# Patient Record
Sex: MALE | Race: WHITE | NOT HISPANIC OR LATINO | URBAN - METROPOLITAN AREA
[De-identification: names, ages, dates, MRNs, and addresses within clinical notes are randomized per-mention and may not be internally consistent; named-entity substitution may affect disease eponyms.]

---

## 2017-12-27 ENCOUNTER — OFFICE VISIT - HEALTHEAST (OUTPATIENT)
Dept: ADDICTION MEDICINE | Facility: HOSPITAL | Age: 32
End: 2017-12-27

## 2017-12-27 DIAGNOSIS — F10.20 SEVERE ALCOHOL USE DISORDER (H): ICD-10-CM

## 2017-12-27 DIAGNOSIS — F14.20 MODERATE COCAINE USE DISORDER (H): ICD-10-CM

## 2017-12-28 ENCOUNTER — OFFICE VISIT - HEALTHEAST (OUTPATIENT)
Dept: ADDICTION MEDICINE | Facility: HOSPITAL | Age: 32
End: 2017-12-28

## 2017-12-28 DIAGNOSIS — F10.20 SEVERE ALCOHOL USE DISORDER (H): ICD-10-CM

## 2017-12-28 DIAGNOSIS — F14.20 MODERATE COCAINE USE DISORDER (H): ICD-10-CM

## 2018-01-03 ENCOUNTER — AMBULATORY - HEALTHEAST (OUTPATIENT)
Dept: ADDICTION MEDICINE | Facility: HOSPITAL | Age: 33
End: 2018-01-03

## 2018-01-03 ENCOUNTER — COMMUNICATION - HEALTHEAST (OUTPATIENT)
Dept: ADDICTION MEDICINE | Facility: HOSPITAL | Age: 33
End: 2018-01-03

## 2018-01-04 ENCOUNTER — OFFICE VISIT - HEALTHEAST (OUTPATIENT)
Dept: ADDICTION MEDICINE | Facility: HOSPITAL | Age: 33
End: 2018-01-04

## 2018-01-04 DIAGNOSIS — F14.20 MODERATE COCAINE USE DISORDER (H): ICD-10-CM

## 2018-01-04 DIAGNOSIS — F10.20 SEVERE ALCOHOL USE DISORDER (H): ICD-10-CM

## 2018-01-05 ENCOUNTER — AMBULATORY - HEALTHEAST (OUTPATIENT)
Dept: ADDICTION MEDICINE | Facility: HOSPITAL | Age: 33
End: 2018-01-05

## 2018-01-08 ENCOUNTER — OFFICE VISIT - HEALTHEAST (OUTPATIENT)
Dept: ADDICTION MEDICINE | Facility: HOSPITAL | Age: 33
End: 2018-01-08

## 2018-01-08 DIAGNOSIS — F14.20 MODERATE COCAINE USE DISORDER (H): ICD-10-CM

## 2018-01-08 DIAGNOSIS — F10.20 SEVERE ALCOHOL USE DISORDER (H): ICD-10-CM

## 2018-01-09 ENCOUNTER — OFFICE VISIT - HEALTHEAST (OUTPATIENT)
Dept: ADDICTION MEDICINE | Facility: HOSPITAL | Age: 33
End: 2018-01-09

## 2018-01-09 DIAGNOSIS — F14.20 MODERATE COCAINE USE DISORDER (H): ICD-10-CM

## 2018-01-09 DIAGNOSIS — F10.20 SEVERE ALCOHOL USE DISORDER (H): ICD-10-CM

## 2018-01-10 ENCOUNTER — OFFICE VISIT - HEALTHEAST (OUTPATIENT)
Dept: ADDICTION MEDICINE | Facility: HOSPITAL | Age: 33
End: 2018-01-10

## 2018-01-10 DIAGNOSIS — F14.20 MODERATE COCAINE USE DISORDER (H): ICD-10-CM

## 2018-01-10 DIAGNOSIS — F10.20 SEVERE ALCOHOL USE DISORDER (H): ICD-10-CM

## 2018-01-11 ENCOUNTER — OFFICE VISIT - HEALTHEAST (OUTPATIENT)
Dept: ADDICTION MEDICINE | Facility: HOSPITAL | Age: 33
End: 2018-01-11

## 2018-01-11 ENCOUNTER — AMBULATORY - HEALTHEAST (OUTPATIENT)
Dept: ADDICTION MEDICINE | Facility: HOSPITAL | Age: 33
End: 2018-01-11

## 2018-01-11 DIAGNOSIS — F10.20 SEVERE ALCOHOL USE DISORDER (H): ICD-10-CM

## 2018-01-11 DIAGNOSIS — F14.20 MODERATE COCAINE USE DISORDER (H): ICD-10-CM

## 2018-01-12 ENCOUNTER — AMBULATORY - HEALTHEAST (OUTPATIENT)
Dept: ADDICTION MEDICINE | Facility: HOSPITAL | Age: 33
End: 2018-01-12

## 2018-01-18 ENCOUNTER — OFFICE VISIT - HEALTHEAST (OUTPATIENT)
Dept: ADDICTION MEDICINE | Facility: HOSPITAL | Age: 33
End: 2018-01-18

## 2018-01-18 DIAGNOSIS — F10.20 SEVERE ALCOHOL USE DISORDER (H): ICD-10-CM

## 2018-01-18 DIAGNOSIS — F14.20 MODERATE COCAINE USE DISORDER (H): ICD-10-CM

## 2018-01-19 ENCOUNTER — AMBULATORY - HEALTHEAST (OUTPATIENT)
Dept: LAB | Facility: HOSPITAL | Age: 33
End: 2018-01-19

## 2018-01-19 ENCOUNTER — AMBULATORY - HEALTHEAST (OUTPATIENT)
Dept: ADDICTION MEDICINE | Facility: HOSPITAL | Age: 33
End: 2018-01-19

## 2018-01-19 DIAGNOSIS — F10.20 SEVERE ALCOHOL USE DISORDER (H): ICD-10-CM

## 2018-01-19 LAB
AMPHETAMINES UR QL SCN: NORMAL
BARBITURATES UR QL: NORMAL
BENZODIAZ UR QL: NORMAL
CANNABINOIDS UR QL SCN: NORMAL
COCAINE UR QL: NORMAL
CREAT UR-MCNC: 121.1 MG/DL
ETHANOL UR CFM-MCNC: <10 MG/DL
METHADONE UR QL SCN: NORMAL
OPIATES UR QL SCN: NORMAL
OXYCODONE UR QL: NORMAL
PCP UR QL SCN: NORMAL

## 2018-01-22 ENCOUNTER — COMMUNICATION - HEALTHEAST (OUTPATIENT)
Dept: ADDICTION MEDICINE | Facility: HOSPITAL | Age: 33
End: 2018-01-22

## 2018-01-25 ENCOUNTER — COMMUNICATION - HEALTHEAST (OUTPATIENT)
Dept: ADDICTION MEDICINE | Facility: HOSPITAL | Age: 33
End: 2018-01-25

## 2018-01-29 ENCOUNTER — OFFICE VISIT - HEALTHEAST (OUTPATIENT)
Dept: ADDICTION MEDICINE | Facility: HOSPITAL | Age: 33
End: 2018-01-29

## 2018-01-29 DIAGNOSIS — F10.20 SEVERE ALCOHOL USE DISORDER (H): ICD-10-CM

## 2018-01-29 DIAGNOSIS — F14.20 MODERATE COCAINE USE DISORDER (H): ICD-10-CM

## 2018-01-30 ENCOUNTER — OFFICE VISIT - HEALTHEAST (OUTPATIENT)
Dept: ADDICTION MEDICINE | Facility: HOSPITAL | Age: 33
End: 2018-01-30

## 2018-01-30 DIAGNOSIS — F14.20 MODERATE COCAINE USE DISORDER (H): ICD-10-CM

## 2018-01-30 DIAGNOSIS — F10.20 SEVERE ALCOHOL USE DISORDER (H): ICD-10-CM

## 2018-01-31 ENCOUNTER — OFFICE VISIT - HEALTHEAST (OUTPATIENT)
Dept: ADDICTION MEDICINE | Facility: HOSPITAL | Age: 33
End: 2018-01-31

## 2018-01-31 ENCOUNTER — AMBULATORY - HEALTHEAST (OUTPATIENT)
Dept: ADDICTION MEDICINE | Facility: HOSPITAL | Age: 33
End: 2018-01-31

## 2018-01-31 DIAGNOSIS — F10.20 SEVERE ALCOHOL USE DISORDER (H): ICD-10-CM

## 2018-01-31 DIAGNOSIS — F14.20 MODERATE COCAINE USE DISORDER (H): ICD-10-CM

## 2018-02-01 ENCOUNTER — OFFICE VISIT - HEALTHEAST (OUTPATIENT)
Dept: ADDICTION MEDICINE | Facility: HOSPITAL | Age: 33
End: 2018-02-01

## 2018-02-01 DIAGNOSIS — F10.20 SEVERE ALCOHOL USE DISORDER (H): ICD-10-CM

## 2018-02-01 DIAGNOSIS — F14.20 MODERATE COCAINE USE DISORDER (H): ICD-10-CM

## 2018-02-06 ENCOUNTER — COMMUNICATION - HEALTHEAST (OUTPATIENT)
Dept: ADDICTION MEDICINE | Facility: HOSPITAL | Age: 33
End: 2018-02-06

## 2018-02-07 ENCOUNTER — OFFICE VISIT - HEALTHEAST (OUTPATIENT)
Dept: ADDICTION MEDICINE | Facility: HOSPITAL | Age: 33
End: 2018-02-07

## 2018-02-07 DIAGNOSIS — F14.20 MODERATE COCAINE USE DISORDER (H): ICD-10-CM

## 2018-02-07 DIAGNOSIS — F10.20 SEVERE ALCOHOL USE DISORDER (H): ICD-10-CM

## 2018-02-08 ENCOUNTER — AMBULATORY - HEALTHEAST (OUTPATIENT)
Dept: ADDICTION MEDICINE | Facility: HOSPITAL | Age: 33
End: 2018-02-08

## 2018-02-08 ENCOUNTER — OFFICE VISIT - HEALTHEAST (OUTPATIENT)
Dept: ADDICTION MEDICINE | Facility: HOSPITAL | Age: 33
End: 2018-02-08

## 2018-02-08 DIAGNOSIS — F10.20 SEVERE ALCOHOL USE DISORDER (H): ICD-10-CM

## 2018-02-08 DIAGNOSIS — F14.20 MODERATE COCAINE USE DISORDER (H): ICD-10-CM

## 2018-02-12 ENCOUNTER — OFFICE VISIT - HEALTHEAST (OUTPATIENT)
Dept: ADDICTION MEDICINE | Facility: HOSPITAL | Age: 33
End: 2018-02-12

## 2018-02-12 DIAGNOSIS — F10.20 SEVERE ALCOHOL USE DISORDER (H): ICD-10-CM

## 2018-02-12 DIAGNOSIS — F14.20 MODERATE COCAINE USE DISORDER (H): ICD-10-CM

## 2018-02-13 ENCOUNTER — AMBULATORY - HEALTHEAST (OUTPATIENT)
Dept: LAB | Facility: HOSPITAL | Age: 33
End: 2018-02-13

## 2018-02-13 ENCOUNTER — OFFICE VISIT - HEALTHEAST (OUTPATIENT)
Dept: ADDICTION MEDICINE | Facility: HOSPITAL | Age: 33
End: 2018-02-13

## 2018-02-13 DIAGNOSIS — F10.20 SEVERE ALCOHOL USE DISORDER (H): ICD-10-CM

## 2018-02-13 DIAGNOSIS — F14.20 MODERATE COCAINE USE DISORDER (H): ICD-10-CM

## 2018-02-13 LAB
AMPHETAMINES UR QL SCN: NORMAL
BARBITURATES UR QL: NORMAL
BENZODIAZ UR QL: NORMAL
CANNABINOIDS UR QL SCN: NORMAL
COCAINE UR QL: NORMAL
CREAT UR-MCNC: 20.8 MG/DL
ETHANOL UR CFM-MCNC: <10 MG/DL
METHADONE UR QL SCN: NORMAL
OPIATES UR QL SCN: NORMAL
OXYCODONE UR QL: NORMAL
PCP UR QL SCN: NORMAL

## 2018-02-15 ENCOUNTER — AMBULATORY - HEALTHEAST (OUTPATIENT)
Dept: ADDICTION MEDICINE | Facility: HOSPITAL | Age: 33
End: 2018-02-15

## 2018-02-19 ENCOUNTER — OFFICE VISIT - HEALTHEAST (OUTPATIENT)
Dept: ADDICTION MEDICINE | Facility: HOSPITAL | Age: 33
End: 2018-02-19

## 2018-02-19 DIAGNOSIS — F10.20 SEVERE ALCOHOL USE DISORDER (H): ICD-10-CM

## 2018-02-19 DIAGNOSIS — F14.20 MODERATE COCAINE USE DISORDER (H): ICD-10-CM

## 2018-02-20 ENCOUNTER — OFFICE VISIT - HEALTHEAST (OUTPATIENT)
Dept: ADDICTION MEDICINE | Facility: HOSPITAL | Age: 33
End: 2018-02-20

## 2018-02-20 DIAGNOSIS — F10.20 SEVERE ALCOHOL USE DISORDER (H): ICD-10-CM

## 2018-02-20 DIAGNOSIS — F14.20 MODERATE COCAINE USE DISORDER (H): ICD-10-CM

## 2018-02-22 ENCOUNTER — AMBULATORY - HEALTHEAST (OUTPATIENT)
Dept: ADDICTION MEDICINE | Facility: HOSPITAL | Age: 33
End: 2018-02-22

## 2018-02-22 ENCOUNTER — COMMUNICATION - HEALTHEAST (OUTPATIENT)
Dept: ADDICTION MEDICINE | Facility: HOSPITAL | Age: 33
End: 2018-02-22

## 2018-02-26 ENCOUNTER — OFFICE VISIT - HEALTHEAST (OUTPATIENT)
Dept: ADDICTION MEDICINE | Facility: HOSPITAL | Age: 33
End: 2018-02-26

## 2018-02-26 DIAGNOSIS — F10.20 SEVERE ALCOHOL USE DISORDER (H): ICD-10-CM

## 2018-02-26 DIAGNOSIS — F14.20 MODERATE COCAINE USE DISORDER (H): ICD-10-CM

## 2018-02-27 ENCOUNTER — AMBULATORY - HEALTHEAST (OUTPATIENT)
Dept: ADDICTION MEDICINE | Facility: HOSPITAL | Age: 33
End: 2018-02-27

## 2018-02-27 ENCOUNTER — OFFICE VISIT - HEALTHEAST (OUTPATIENT)
Dept: ADDICTION MEDICINE | Facility: HOSPITAL | Age: 33
End: 2018-02-27

## 2018-02-27 DIAGNOSIS — F14.20 MODERATE COCAINE USE DISORDER (H): ICD-10-CM

## 2018-02-27 DIAGNOSIS — F10.20 SEVERE ALCOHOL USE DISORDER (H): ICD-10-CM

## 2018-03-08 ENCOUNTER — OFFICE VISIT - HEALTHEAST (OUTPATIENT)
Dept: ADDICTION MEDICINE | Facility: HOSPITAL | Age: 33
End: 2018-03-08

## 2018-03-08 DIAGNOSIS — F14.20 MODERATE COCAINE USE DISORDER (H): ICD-10-CM

## 2018-03-08 DIAGNOSIS — F10.20 SEVERE ALCOHOL USE DISORDER (H): ICD-10-CM

## 2018-03-09 ENCOUNTER — AMBULATORY - HEALTHEAST (OUTPATIENT)
Dept: ADDICTION MEDICINE | Facility: HOSPITAL | Age: 33
End: 2018-03-09

## 2018-03-14 ENCOUNTER — OFFICE VISIT - HEALTHEAST (OUTPATIENT)
Dept: ADDICTION MEDICINE | Facility: HOSPITAL | Age: 33
End: 2018-03-14

## 2018-03-14 DIAGNOSIS — F14.20 MODERATE COCAINE USE DISORDER (H): ICD-10-CM

## 2018-03-14 DIAGNOSIS — F10.20 SEVERE ALCOHOL USE DISORDER (H): ICD-10-CM

## 2018-03-15 ENCOUNTER — OFFICE VISIT - HEALTHEAST (OUTPATIENT)
Dept: ADDICTION MEDICINE | Facility: HOSPITAL | Age: 33
End: 2018-03-15

## 2018-03-15 ENCOUNTER — AMBULATORY - HEALTHEAST (OUTPATIENT)
Dept: ADDICTION MEDICINE | Facility: HOSPITAL | Age: 33
End: 2018-03-15

## 2018-03-15 DIAGNOSIS — F14.20 MODERATE COCAINE USE DISORDER (H): ICD-10-CM

## 2018-03-15 DIAGNOSIS — F10.20 SEVERE ALCOHOL USE DISORDER (H): ICD-10-CM

## 2018-03-21 ENCOUNTER — COMMUNICATION - HEALTHEAST (OUTPATIENT)
Dept: ADDICTION MEDICINE | Facility: HOSPITAL | Age: 33
End: 2018-03-21

## 2018-03-22 ENCOUNTER — OFFICE VISIT - HEALTHEAST (OUTPATIENT)
Dept: ADDICTION MEDICINE | Facility: HOSPITAL | Age: 33
End: 2018-03-22

## 2018-03-22 DIAGNOSIS — F14.20 MODERATE COCAINE USE DISORDER (H): ICD-10-CM

## 2018-03-22 DIAGNOSIS — F10.20 SEVERE ALCOHOL USE DISORDER (H): ICD-10-CM

## 2018-03-23 ENCOUNTER — AMBULATORY - HEALTHEAST (OUTPATIENT)
Dept: LAB | Facility: HOSPITAL | Age: 33
End: 2018-03-23

## 2018-03-23 ENCOUNTER — AMBULATORY - HEALTHEAST (OUTPATIENT)
Dept: ADDICTION MEDICINE | Facility: HOSPITAL | Age: 33
End: 2018-03-23

## 2018-03-23 DIAGNOSIS — F10.20 SEVERE ALCOHOL USE DISORDER (H): ICD-10-CM

## 2018-03-23 LAB
AMPHETAMINES UR QL SCN: ABNORMAL
BARBITURATES UR QL: ABNORMAL
BENZODIAZ UR QL: ABNORMAL
CANNABINOIDS UR QL SCN: ABNORMAL
COCAINE UR QL: ABNORMAL
CREAT UR-MCNC: 482 MG/DL
ETHANOL UR CFM-MCNC: <10 MG/DL
METHADONE UR QL SCN: ABNORMAL
OPIATES UR QL SCN: ABNORMAL
OXYCODONE UR QL: ABNORMAL
PCP UR QL SCN: ABNORMAL

## 2018-03-26 LAB
BENZOYLECGONINE-BY GC/MS: 1375 NG/ML
COCAINE-BY GC/MS: NEGATIVE NG/ML
TOXICOLOGIST REVIEW: NORMAL

## 2018-03-28 ENCOUNTER — OFFICE VISIT - HEALTHEAST (OUTPATIENT)
Dept: ADDICTION MEDICINE | Facility: HOSPITAL | Age: 33
End: 2018-03-28

## 2018-03-28 DIAGNOSIS — F10.20 SEVERE ALCOHOL USE DISORDER (H): ICD-10-CM

## 2018-03-28 DIAGNOSIS — F14.20 MODERATE COCAINE USE DISORDER (H): ICD-10-CM

## 2018-03-29 ENCOUNTER — OFFICE VISIT - HEALTHEAST (OUTPATIENT)
Dept: ADDICTION MEDICINE | Facility: HOSPITAL | Age: 33
End: 2018-03-29

## 2018-03-29 DIAGNOSIS — F10.20 SEVERE ALCOHOL USE DISORDER (H): ICD-10-CM

## 2018-03-29 DIAGNOSIS — F14.20 MODERATE COCAINE USE DISORDER (H): ICD-10-CM

## 2018-03-30 ENCOUNTER — AMBULATORY - HEALTHEAST (OUTPATIENT)
Dept: ADDICTION MEDICINE | Facility: HOSPITAL | Age: 33
End: 2018-03-30

## 2018-04-02 ENCOUNTER — OFFICE VISIT - HEALTHEAST (OUTPATIENT)
Dept: ADDICTION MEDICINE | Facility: HOSPITAL | Age: 33
End: 2018-04-02

## 2018-04-02 DIAGNOSIS — F14.20 MODERATE COCAINE USE DISORDER (H): ICD-10-CM

## 2018-04-02 DIAGNOSIS — F10.20 SEVERE ALCOHOL USE DISORDER (H): ICD-10-CM

## 2018-04-05 ENCOUNTER — AMBULATORY - HEALTHEAST (OUTPATIENT)
Dept: ADDICTION MEDICINE | Facility: HOSPITAL | Age: 33
End: 2018-04-05

## 2018-04-10 ENCOUNTER — COMMUNICATION - HEALTHEAST (OUTPATIENT)
Dept: ADDICTION MEDICINE | Facility: HOSPITAL | Age: 33
End: 2018-04-10

## 2018-04-11 ENCOUNTER — OFFICE VISIT - HEALTHEAST (OUTPATIENT)
Dept: ADDICTION MEDICINE | Facility: HOSPITAL | Age: 33
End: 2018-04-11

## 2018-04-11 DIAGNOSIS — F10.20 SEVERE ALCOHOL USE DISORDER (H): ICD-10-CM

## 2018-04-11 DIAGNOSIS — F14.20 MODERATE COCAINE USE DISORDER (H): ICD-10-CM

## 2018-04-12 ENCOUNTER — AMBULATORY - HEALTHEAST (OUTPATIENT)
Dept: ADDICTION MEDICINE | Facility: HOSPITAL | Age: 33
End: 2018-04-12

## 2018-04-18 ENCOUNTER — COMMUNICATION - HEALTHEAST (OUTPATIENT)
Dept: ADDICTION MEDICINE | Facility: HOSPITAL | Age: 33
End: 2018-04-18

## 2018-04-23 ENCOUNTER — COMMUNICATION - HEALTHEAST (OUTPATIENT)
Dept: ADDICTION MEDICINE | Facility: HOSPITAL | Age: 33
End: 2018-04-23

## 2018-04-25 ENCOUNTER — OFFICE VISIT - HEALTHEAST (OUTPATIENT)
Dept: ADDICTION MEDICINE | Facility: HOSPITAL | Age: 33
End: 2018-04-25

## 2018-04-25 DIAGNOSIS — F14.20 MODERATE COCAINE USE DISORDER (H): ICD-10-CM

## 2018-04-25 DIAGNOSIS — F10.20 SEVERE ALCOHOL USE DISORDER (H): ICD-10-CM

## 2018-04-26 ENCOUNTER — AMBULATORY - HEALTHEAST (OUTPATIENT)
Dept: ADDICTION MEDICINE | Facility: HOSPITAL | Age: 33
End: 2018-04-26

## 2018-05-02 ENCOUNTER — COMMUNICATION - HEALTHEAST (OUTPATIENT)
Dept: ADDICTION MEDICINE | Facility: HOSPITAL | Age: 33
End: 2018-05-02

## 2018-05-09 ENCOUNTER — OFFICE VISIT - HEALTHEAST (OUTPATIENT)
Dept: ADDICTION MEDICINE | Facility: HOSPITAL | Age: 33
End: 2018-05-09

## 2018-05-09 DIAGNOSIS — F14.20 MODERATE COCAINE USE DISORDER (H): ICD-10-CM

## 2018-05-09 DIAGNOSIS — F10.20 SEVERE ALCOHOL USE DISORDER (H): ICD-10-CM

## 2018-05-10 ENCOUNTER — AMBULATORY - HEALTHEAST (OUTPATIENT)
Dept: ADDICTION MEDICINE | Facility: HOSPITAL | Age: 33
End: 2018-05-10

## 2018-05-17 ENCOUNTER — OFFICE VISIT - HEALTHEAST (OUTPATIENT)
Dept: ADDICTION MEDICINE | Facility: HOSPITAL | Age: 33
End: 2018-05-17

## 2018-05-17 DIAGNOSIS — F14.20 MODERATE COCAINE USE DISORDER (H): ICD-10-CM

## 2018-05-17 DIAGNOSIS — F10.20 SEVERE ALCOHOL USE DISORDER (H): ICD-10-CM

## 2018-05-18 ENCOUNTER — AMBULATORY - HEALTHEAST (OUTPATIENT)
Dept: ADDICTION MEDICINE | Facility: HOSPITAL | Age: 33
End: 2018-05-18

## 2020-07-16 ENCOUNTER — COMMUNICATION - HEALTHEAST (OUTPATIENT)
Dept: SCHEDULING | Facility: CLINIC | Age: 35
End: 2020-07-16

## 2021-01-21 ENCOUNTER — SURGERY - HEALTHEAST (OUTPATIENT)
Dept: SURGERY | Facility: HOSPITAL | Age: 36
End: 2021-01-21

## 2021-01-21 ENCOUNTER — ANESTHESIA - HEALTHEAST (OUTPATIENT)
Dept: SURGERY | Facility: HOSPITAL | Age: 36
End: 2021-01-21

## 2021-01-21 ASSESSMENT — MIFFLIN-ST. JEOR
SCORE: 2281.17
SCORE: 2281.17

## 2021-01-22 ENCOUNTER — COMMUNICATION - HEALTHEAST (OUTPATIENT)
Dept: SCHEDULING | Facility: CLINIC | Age: 36
End: 2021-01-22

## 2021-01-22 ASSESSMENT — MIFFLIN-ST. JEOR
SCORE: 2319.73
SCORE: 2319.73

## 2021-01-24 ENCOUNTER — SURGERY - HEALTHEAST (OUTPATIENT)
Dept: SURGERY | Facility: HOSPITAL | Age: 36
End: 2021-01-24

## 2021-01-24 ENCOUNTER — ANESTHESIA - HEALTHEAST (OUTPATIENT)
Dept: SURGERY | Facility: HOSPITAL | Age: 36
End: 2021-01-24

## 2021-01-25 ENCOUNTER — COMMUNICATION - HEALTHEAST (OUTPATIENT)
Dept: SURGERY | Facility: CLINIC | Age: 36
End: 2021-01-25

## 2021-01-25 ASSESSMENT — MIFFLIN-ST. JEOR
SCORE: 2330.61
SCORE: 2330.61

## 2021-02-02 ENCOUNTER — OFFICE VISIT - HEALTHEAST (OUTPATIENT)
Dept: SURGERY | Facility: CLINIC | Age: 36
End: 2021-02-02

## 2021-02-02 DIAGNOSIS — M72.6: ICD-10-CM

## 2021-02-02 DIAGNOSIS — Z48.89 POSTOPERATIVE VISIT: ICD-10-CM

## 2021-02-04 ENCOUNTER — OFFICE VISIT - HEALTHEAST (OUTPATIENT)
Dept: SURGERY | Facility: CLINIC | Age: 36
End: 2021-02-04

## 2021-02-04 DIAGNOSIS — Z48.89 POSTOPERATIVE VISIT: ICD-10-CM

## 2021-02-15 ENCOUNTER — OFFICE VISIT - HEALTHEAST (OUTPATIENT)
Dept: SURGERY | Facility: CLINIC | Age: 36
End: 2021-02-15

## 2021-02-15 ENCOUNTER — COMMUNICATION - HEALTHEAST (OUTPATIENT)
Dept: SURGERY | Facility: CLINIC | Age: 36
End: 2021-02-15

## 2021-02-15 DIAGNOSIS — Z48.89 POSTOPERATIVE VISIT: ICD-10-CM

## 2021-02-15 DIAGNOSIS — M72.6: ICD-10-CM

## 2021-02-15 DIAGNOSIS — R21 RASH: ICD-10-CM

## 2021-02-15 RX ORDER — CETIRIZINE HYDROCHLORIDE 10 MG/1
10 TABLET ORAL DAILY
Refills: 0 | Status: SHIPPED | COMMUNITY
Start: 2021-02-15

## 2021-02-23 ENCOUNTER — OFFICE VISIT - HEALTHEAST (OUTPATIENT)
Dept: SURGERY | Facility: CLINIC | Age: 36
End: 2021-02-23

## 2021-02-23 DIAGNOSIS — Z48.89 POSTOPERATIVE VISIT: ICD-10-CM

## 2021-05-30 ENCOUNTER — RECORDS - HEALTHEAST (OUTPATIENT)
Dept: ADMINISTRATIVE | Facility: CLINIC | Age: 36
End: 2021-05-30

## 2021-06-05 VITALS
HEIGHT: 69 IN | BODY MASS INDEX: 46.05 KG/M2 | WEIGHT: 310.9 LBS | HEIGHT: 69 IN | WEIGHT: 310.9 LBS | BODY MASS INDEX: 46.05 KG/M2

## 2021-06-09 NOTE — TELEPHONE ENCOUNTER
"Pt calling    Coworker has tested + for covid 2 weeks ago  Pt was in close contact with this person    Pt to be tested sat for covid but needs letter that he is under care of a medical professional     Pt symptoms started 2 day ago  Sweating   Chills  Fever not known,pt has no thermometer fatigue   Loss of sense of taste  Body aches  shortness of breath only with exertion  diarrhea  +HA  Per pt 'slight cough\"    Referred pt to [facility]  Reviewed care advice & when to call back  Pt agrees with plan  Tia Ogden RN  [facility] Nurse Advisor    Reason for Disposition    [1] COVID-19 infection suspected by caller or triager AND [2] mild symptoms (cough, fever, or others) AND [3] no complications or SOB     Pt contacting [email or web address] & has appt for covid test 7/18/2020    Additional Information    Negative: SEVERE difficulty breathing (e.g., struggling for each breath, speaks in single words)    Negative: Difficult to awaken or acting confused (e.g., disoriented, slurred speech)    Negative: Bluish (or gray) lips or face now    Negative: Shock suspected (e.g., cold/pale/clammy skin, too weak to stand, low BP, rapid pulse)    Negative: Sounds like a life-threatening emergency to the triager    Negative: [1] COVID-19 exposure AND [2] no symptoms    Negative: COVID-19 and Breastfeeding, questions about    Negative: [1] Adult with possible COVID-19 symptoms AND [2] triager concerned about severity of symptoms or other causes    Negative: SEVERE or constant chest pain or pressure (Exception: mild central chest pain, present only when coughing)    Negative: MODERATE difficulty breathing (e.g., speaks in phrases, SOB even at rest, pulse 100-120)    Negative: Patient sounds very sick or weak to the triager    Negative: MILD difficulty breathing (e.g., minimal/no SOB at rest, SOB with walking, pulse <100)    Negative: Chest pain or pressure    Negative: Fever > 103 F (39.4 C)    Negative: [1] Fever > 101 F (38.3 C) AND [2] age > 60    " Negative: [1] Fever > 100.0 F (37.8 C) AND [2] bedridden (e.g., nursing home patient, CVA, chronic illness, recovering from surgery)    Negative: HIGH RISK patient (e.g., age > 64 years, diabetes, heart or lung disease, weak immune system)    Negative: Fever present > 3 days (72 hours)    Negative: [1] Fever returns after gone for over 24 hours AND [2] symptoms worse or not improved    Negative: [1] Continuous (nonstop) coughing interferes with work or school AND [2] no improvement using cough treatment per protocol    [facility] Specific Disposition  - [facility] Specific Patient Instructions  COVID 19 Nurse Triage Plan/Patient Instructions    Please be aware that novel coronavirus (COVID-19) may be circulating in the community. If you develop symptoms such as fever, cough, or SOB or if you have concerns about the presence of another infection including coronavirus (COVID-19), please contact your health care provider or visit [email or web address].       Virtual Visit with provider recommended. Reference Visit Selection Guide.    Thank you for taking steps to prevent the spread of this virus.  Limit your contact with others.  Wear a simple mask to cover your cough.  Wash your hands well and often.    Resources  [facility]: About COVID-19: [email or web address]  CDC: What to Do If You're Sick: www.cdc.gov/coronavirus/2019-ncov/about/steps-when-sick.html  CDC: Ending Home Isolation: www.cdc.gov/coronavirus/2019-ncov/hcp/disposition-in-home-patients.html   CDC: Caring for Someone: www.cdc.gov/coronavirus/2019-ncov/if-you-are-sick/care-for-someone.html   [facility]: Interim Guidance for Hospital Discharge to Home: www.health.[facility].[place]/diseases/coronavirus/hcp/hospdischarge.pdf  [facility] clinical trials (COVID-19 research studies): [email or web address]/[email or web address]-trials   Below are the COVID-19 hotlines at the [place] Department of [facility] ([facility]). Interpreters are available.   For  health questions: Call 858-816-1459 or 1-157.222.4101 (7 a.m. to 7 p.m.)  For questions about schools and childcare: Call 069-044-3277 or 1-243.707.1597 (7 a.m. to 7 p.m.)    Protocols used: CORONAVIRUS (COVID-19) DIAGNOSED OR [id number]-Q-DG 5.16.20

## 2021-06-14 NOTE — ANESTHESIA PREPROCEDURE EVALUATION
Anesthesia Evaluation      Patient summary reviewed   No history of anesthetic complications     Airway   Mallampati: III  Neck ROM: full   Pulmonary     breath sounds clear to auscultation  (+) a smoker  (-) pneumonia, asthma, shortness of breath, recent URI, sleep apnea                         Cardiovascular   Exercise tolerance: > or = 4 METS  (-) angina  Rhythm: regular  Rate: normal,         Neuro/Psych    (-) no CVA    Endo/Other    (+) obesity,   (-) no diabetes     GI/Hepatic/Renal            Dental    (+) caps                       Anesthesia Plan  Planned anesthetic: general endotracheal  Decadron 10mg, zofran   Ketamine 50mg  glidescope intubation   ASA 3   Induction: intravenous   Anesthetic plan and risks discussed with: patient  Anesthesia plan special considerations: video-assisted, increased risk of difficult airway, antiemetics,   Post-op plan: routine recovery

## 2021-06-14 NOTE — ANESTHESIA POSTPROCEDURE EVALUATION
Patient: Glenn Rene  Procedure(s):  INCISION AND DRAINAGE, LOWER EXTREMITY (Right)  Anesthesia type: general    Patient location: PACU  Last vitals:   Vitals Value Taken Time   /63 01/24/21 1650   Temp  01/24/21 1701   Pulse 80 01/24/21 1657   Resp 22 01/24/21 1657   SpO2 98 % 01/24/21 1657   Vitals shown include unvalidated device data.  Post vital signs: stable  Level of consciousness: awake and responds to simple questions  Post-anesthesia pain: pain controlled  Post-anesthesia nausea and vomiting: no  Pulmonary: unassisted, return to baseline  Cardiovascular: stable and blood pressure at baseline  Hydration: adequate  Anesthetic events: no    QCDR Measures:  ASA# 11 - Ursula-op Cardiac Arrest: ASA11B - Patient did NOT experience unanticipated cardiac arrest  ASA# 12 - Ursula-op Mortality Rate: ASA12B - Patient did NOT die  ASA# 13 - PACU Re-Intubation Rate: ASA13B - Patient did NOT require a new airway mgmt  ASA# 10 - Composite Anes Safety: ASA10A - No serious adverse event    Additional Notes:

## 2021-06-14 NOTE — ANESTHESIA PREPROCEDURE EVALUATION
Anesthesia Evaluation      Patient summary reviewed   No history of anesthetic complications     Airway   Mallampati: II  Neck ROM: full   Pulmonary - normal exam   (+) a smoker                         Cardiovascular - negative ROS and normal exam  Exercise tolerance: > or = 4 METS   Neuro/Psych - negative ROS     Endo/Other    (+) obesity (BMI 44),      Comments: Necrotizing faciitis, right groin, upper thigh    GI/Hepatic/Renal - negative ROS   (-) GERD          Dental - normal exam                        Anesthesia Plan  Planned anesthetic: general endotracheal and total IV anesthesia  Ketamine  Decadron  Zofran    Reverse with Sugammadex  ASA 3 - emergent   Induction: intravenous   Anesthetic plan and risks discussed with: patient  Anesthesia plan special considerations: antiemetics,   Post-op plan: routine recovery

## 2021-06-14 NOTE — PROGRESS NOTES
HPI: Pt is here for follow up of a Necrotizing fasciitis right thigh status post I&D 1/21/21 with Dr. Michaud and  re-excision 1/24/21 with Dr. Silverman.  Patient hospitalized between 1/21/2021 and 1/26/2021.  Discharged with augmentin 875mg PO two times a day and clinda 450mg three times a day each for ten daysh he is doing well.  Pain is well controlled.  No difficulties with the surgical wound/wounds.  he is eating well and denies fever and chills.     Is in a lot of pain still and ran out of his pain medications.  He does not have a fever or chills.  He is packing his own wounds.    There were no vitals taken for this visit.    EXAM:  GENERAL:Appears well  Right thigh wounds are granulating in and closing down.  He has a lot of induration surrounding.  His erythema has improved.  He has a little bit of a light lacy erythema down his leg but it is not consistent with cellulitis.  Probed wound and did not notice any fluid collection.  Repacked the wounds.    Assessment/Plan: .  Reviewed how to pack his wound again.  But I am concerned about the amount of induration.  Since he does not have a fever and this definitely looks much better than when I last saw it we will does have him follow-up with Dr. Michaud this week.  If any increase in pain or fevers he should be going to the emergency room for imaging.  Kristyn Tolbert PA-C  [facility] Department of Surgery

## 2021-06-14 NOTE — ANESTHESIA CARE TRANSFER NOTE
Last vitals:   Vitals:    01/21/21 2220   BP: 133/84   Pulse: (!) 104   Resp: (!) 32   Temp: 37  C (98.6  F)   SpO2: 98%     Patient's level of consciousness is drowsy  Spontaneous respirations: yes  Maintains airway independently: yes  Dentition unchanged: yes  Oropharynx: oropharynx clear of all foreign objects    QCDR Measures:  ASA# 20 - Surgical Safety Checklist: WHO surgical safety checklist completed prior to induction    PQRS# 430 - Adult PONV Prevention: 4558F - Pt received => 2 anti-emetic agents (different classes) preop & intraop  ASA# 8 - Peds PONV Prevention: NA - Not pediatric patient, not GA or 2 or more risk factors NOT present  PQRS# 424 - Ursula-op Temp Management: 4559F - At least one body temp DOCUMENTED => 35.5C or 95.9F within required timeframe  PQRS# 426 - PACU Transfer Protocol: - Transfer of care checklist used  ASA# 14 - Acute Post-op Pain: ASA14B - Patient did NOT experience pain >= 7 out of 10

## 2021-06-14 NOTE — ANESTHESIA CARE TRANSFER NOTE
Last vitals:   Vitals:    01/24/21 1530   BP: 166/88   Pulse: 86   Resp: 16   Temp: 37  C (98.6  F)   SpO2: 96%     Patient's level of consciousness is drowsy  Spontaneous respirations: yes  Maintains airway independently: yes  Dentition unchanged: yes  Oropharynx: oropharynx clear of all foreign objects    QCDR Measures:  ASA# 20 - Surgical Safety Checklist: WHO surgical safety checklist completed prior to induction    PQRS# 430 - Adult PONV Prevention: 4558F - Pt received => 2 anti-emetic agents (different classes) preop & intraop  ASA# 8 - Peds PONV Prevention: NA - Not pediatric patient, not GA or 2 or more risk factors NOT present  PQRS# 424 - Ursula-op Temp Management: 4559F - At least one body temp DOCUMENTED => 35.5C or 95.9F within required timeframe  PQRS# 426 - PACU Transfer Protocol: - Transfer of care checklist used  ASA# 14 - Acute Post-op Pain: ASA14B - Patient did NOT experience pain >= 7 out of 10

## 2021-06-14 NOTE — ANESTHESIA POSTPROCEDURE EVALUATION
Patient: Glenn Rene  Procedure(s):  INCISION AND DRAINAGE RIGHT LEG/UPPER THIGH (Right)  Anesthesia type: general    Patient location: PACU  Last vitals:   Vitals Value Taken Time   /82 01/21/21 2250   Temp 37  C (98.6  F) 01/21/21 2220   Pulse 96 01/21/21 2252   Resp 27 01/21/21 2252   SpO2 100 % 01/21/21 2252   Vitals shown include unvalidated device data.  Post vital signs: stable  Level of consciousness: awake, alert and responds to simple questions  Post-anesthesia pain: pain controlled  Post-anesthesia nausea and vomiting: no  Pulmonary: unassisted, face mask  Cardiovascular: stable and blood pressure at baseline  Hydration: adequate  Anesthetic events: no    QCDR Measures:  ASA# 11 - Ursula-op Cardiac Arrest: ASA11B - Patient did NOT experience unanticipated cardiac arrest  ASA# 12 - Ursula-op Mortality Rate: ASA12B - Patient did NOT die  ASA# 13 - PACU Re-Intubation Rate: ASA13B - Patient did NOT require a new airway mgmt  ASA# 10 - Composite Anes Safety: ASA10A - No serious adverse event    Additional Notes:

## 2021-06-15 NOTE — PROGRESS NOTES
Daily Group Note:    Glenn Rene attended 2 hours of group therapy today. T) Co-Occurring Disorders.      Staff Name and Title: RANDOLPH Jaime, [facility]    Date:  1/10/2018  Time:  6:57 PM

## 2021-06-15 NOTE — PROGRESS NOTES
Daily Group Note:    Glenn Rene attended 3 hours of group therapy today. T) Unmanageable Feelings.      Staff Name and Title: RANDOLPH Jaime, [facility]    Date:  2/7/2018  Time:  8:36 PM

## 2021-06-15 NOTE — PROGRESS NOTES
Weekly Progress Note  Glenn Rene  1985  [id number]      D) Pt. attended 4 groups this week with 0 absences. Pt. attended 0 individual sessions this week. A) Staff facilitated groups and reviewed tx progress. Assessed for [facility]. R) No VAP needed at this time.    Any significant events, defines as events that impact patients relationship with others inside and outside of treatment: No.  Indicate any changes or monitoring of physical or mental health problems: No.    Indicate involvement by any outside supports: No  IAPP reviewed and modified as needed: N/A       Pt. working on the following dimensions:    Dimension #1 - Withdrawal Potential - Risk 0.     Specific goals from treatment plan addressed this week:  Address withdrawal issues appropriately.   Effectiveness of strategies:  No reported use. Last reported use, cocaine 11/24/17, alcohol 12/26/17. Pt. Reports much improvement after some difficult coping with withdrawal symptoms last week. He reports no current concern.    Dimension #2 - Biomedical - Risk 0.     Specific goals from treatment plan addressed this week:  Maintain regular exercise.   Effectiveness of strategies:  Pt. Reports going to the gym 3x this week.    Dimension #3 - Emotional/Behavioral/Cognitive - Risk 2.     Specific goals from treatment plan addressed this week:  Strengthen health coping skills of MH issues.  Effectiveness of strategies:  Pt. Reports continued work on improving his outlook on stressful and frustrating situations in his life. He reports no mood issues this week. Mood appears healthy and positive. Pt. Seems to have increased his ability to manage his anger and stress. He reports being mindful of his issues with impulsivity, making sure not to rush into a unhealthy initial reaction and taking time to think it through first.    Dimension #4 - Treatment Acceptance/Resistance - Risk 0.    Specific goals from treatment plan addressed this week:  Follow through on personal  commitment to complete treatment.  Effectiveness of strategies:  Pt. Attends group as scheduled and participates well in group session.    Dimension #5 - Relapse Potential - Risk 3.     Specific goals from treatment plan addressed this week:  Build awareness and insight into use patterns, relapse risk factors, and current recovery support needs.  Effectiveness of strategies:  Pt. Reports maintaining sobriety this week. He reports that he does experience occasional urges to use. Counselor assisted him in identifying the source, Pt. Identified after work as a time when a lot of his use occurred and now when he experiences urges.    Dimension #6 - Recovery Environment - Risk 2.     Specific goals from treatment plan addressed this week:  Establish a connection with spiritual supports.  Effectiveness of strategies:  Pt. Reports he had not gotten to Baptism this past weekend. He reports his sister could not go with him and he was nervous to go alone. Pt. Reports he has been offered a evening job and if he takes it not being able to attend group sessions. Pt. Reports he is looking at all his options before making a decision. Counselor is also working with Pt. to explore possibilities for remaining in treatment.    T) Treatment plan updated: No.  Patient notified and in agreement: N/A.  Patient educated on: Co-Occurring Disorders. Patient has completed 17 of 84 program hours at this time. Projected discharge date is: 3/22/18. Current discharge plan is: PENDING.     Staff Name and Title:   RANDOLPH Jaime, [facility]  Date: 1/12/2018  Time: 9:31 AM      Psycho-Educational Curriculum  Educational Videos  Date (s) Attended    Nature of Addiction       Cross Addiction  1/4/18   Co-Occurring Disorders          1/8/18,  1/9/18,  1/10/18,  1/11/18,   Distorted Thinking            12 Steps             Communication Skills             Unmanageable Feelings             Relapse Prevention             Relationships (Week 1)             Relationships (Week 2)            Distorted Self Image            Recovery Maintenance             Holiday Planning         12/27/17,  12/28/17,                                       Mandatory Education:       HIV/AIDS

## 2021-06-15 NOTE — PROGRESS NOTES
History:  Glenn Rene is s/p right thigh incision and drainage on 1/21 and 1/24 for a necrotizing infection.  He is overall having continued improvement.  There is some firmness at the most posterior aspect of his wound.  Completes his antibiotics tomorrow.    Exam:  GEN: No acute distress, comfortable  SKIN: Healthy granulation tissue within both incisions in the upper inner right thigh.  Small amount of fibrinous debris.  The right inner thigh does have a mild red/purple discoloration to it, but is not consistent with cellulitis or underlying infection.  I think the firmness at the most inferior aspect is due to dependent edema    ASSESSMENT:  Glenn Rene is s/p incision and drainage x2 for a right thigh necrotizing infection    PLAN:  Continue with moistened gauze dressing changes daily.  No need for further antibiotics at this time.  Follow-up in the office in 1-1/2 weeks for wound check.  It will likely take a couple weeks for these wounds to completely heal.  I would not be opposed to him returning to work a little bit before these have completely healed.    Sanjana Michaud DO  General Surgeon  [facility]  Surgery [facility] - [address]  [address]  [place] [address]  Office: [phone]  Employed by - [facility]

## 2021-06-15 NOTE — PROGRESS NOTES
Weekly Progress Note  Glenn Rene  1985  [id number]      D) Pt. attended 4 groups this week with 0 absences. Pt. attended 0 individual sessions this week. A) Staff facilitated groups and reviewed tx progress. Assessed for [facility]. R) No VAP needed at this time.    Any significant events, defines as events that impact patients relationship with others inside and outside of treatment: No.  Indicate any changes or monitoring of physical or mental health problems: No.    Indicate involvement by any outside supports: No  IAPP reviewed and modified as needed: N/A       Pt. working on the following dimensions:    Dimension #1 - Withdrawal Potential - Risk 0.     Specific goals from treatment plan addressed this week:  Address withdrawal issues appropriately.   Effectiveness of strategies:  Reported use this week, denies withdrawal issues. Last reported use, cocaine 1/26/18, alcohol 1/26/18.     Dimension #2 - Biomedical - Risk 0.     Specific goals from treatment plan addressed this week:  Maintain regular exercise.   Effectiveness of strategies:  Pt. Reports he has not been going to the gym, but plans to get back into his routine.    Dimension #3 - Emotional/Behavioral/Cognitive - Risk 2.     Specific goals from treatment plan addressed this week:  Strengthen health coping skills of MH issues.  Effectiveness of strategies:  Pt. Reports continued work on placing a greater focus on himself and not the actions and attitudes of others. He reports mood issues this week with getting angry/frustrated by being provoked by his ex. Mood appears healthy and positive. Pt. Was able to process his return into a pattern of rushing into a unhealthy initial reaction and not taking time to think it through first. Counselor also educated Pt. In a brief 1x1 session on how anger and other feeling need to be experienced. Pt. With counselor was able to identify unhealthy beliefs he has held about feelings and how they should be dealt with.      Dimension #4 - Treatment Acceptance/Resistance - Risk 0.    Specific goals from treatment plan addressed this week:  Follow through on personal commitment to complete treatment.  Effectiveness of strategies:  Pt. Reports his schedule has change and he can return to attending group as designed in phase one of the program. He has this week and will continue to attend group 4x weekly, 3 hours each session.    Dimension #5 - Relapse Potential - Risk 3.     Specific goals from treatment plan addressed this week:  Build awareness and insight into use patterns, relapse risk factors, and current recovery support needs.  Effectiveness of strategies:  Pt. Reports a relapse with alcohol and cocaine on 1/26/18, denies any further use. this week. Pt. Processed is relapse openly in group and 1x1 with counselor. Pt. Seems to be becoming aware to how much sobriety support is needed in his recovery.     Dimension #6 - Recovery Environment - Risk 2.     Specific goals from treatment plan addressed this week:  Establish a connection with spiritual supports.  Effectiveness of strategies:  Pt. Reports he had not gotten to Islam yet. He was able to gather some recommend spiritual and sober supportive resources from group peers and counselor. Pt. 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 He shared how the overwhelming work schedule was impacting his well-being and recognizing a need for change. He reports hopes to now maintain his primary job and this new weekend job for appropriate financial stability. Pt. Also reports working with his  on plans to have his child support and visitation judgement revised. Pt. Reports he continues to have 2x weekly supervised visits with his son in the meantime.    T) Treatment plan updated: No.  Patient notified and in agreement: N/A.  Patient educated on: Communication. Patient has completed 32 of 84  program hours at this time. Projected discharge date is: 3/22/18. Current discharge plan is: PENDING.     Staff Name and Title:   RANDOLPH Jaime, [facility]  Date: 2/1/2018  Time: 8:48 PM    Psycho-Educational Curriculum  Educational Videos  Date (s) Attended    Nature of Addiction       Cross Addiction  1/4/18   Co-Occurring Disorders          1/8/18,  1/9/18,  1/10/18,  1/11/18,   Distorted Thinking         1/18/18,   12 Steps             Communication Skills          1/29/18,  1/30/18,  1/31/18,  2/1/18,   Unmanageable Feelings             Relapse Prevention             Relationships (Week 1)            Relationships (Week 2)            Distorted Self Image            Recovery Maintenance             Holiday Planning         12/27/17,  12/28/17,                                       Mandatory Education:       HIV/AIDS

## 2021-06-15 NOTE — PROGRESS NOTES
Daily Group Note:    Glenn Rene attended 3 hours of group therapy today. T) Communication.      Staff Name and Title: RANDOLPH Jaime, [facility]    Date:  1/30/2018  Time:  8:58 PM

## 2021-06-15 NOTE — PROGRESS NOTES
Daily Group Note:    Glenn Rene attended 3 hours of group therapy today. T) HIV/AIDS Education and Co-Occurring Disorders.      Staff Name and Title: RANDOLPH Jaime, [facility]    Date:  1/8/2018  Time:  6:14 PM

## 2021-06-15 NOTE — PROGRESS NOTES
Daily Group Note:    Glenn Rene attended 3 hours of group therapy today. T) Unmanageable Feelings.        Staff Name and Title:   RANDOLPH Jaime, [facility]  Date: 2/8/2018  Time: 8:53 PM

## 2021-06-15 NOTE — PROGRESS NOTES
Daily Group Note:    Glenn Rene attended 3 hours of group therapy today. T) Co-Occurring Disorders.      Staff Name and Title: RANDOLPH Jaime, [facility]    Date:  1/9/2018  Time:  8:57 PM    
unintentional

## 2021-06-15 NOTE — PROGRESS NOTES
"Intake Note:   D) Glenn Rene is a 32 y.o.  single White or  male who is referred to OP CD treatment via [facility] [employer] with funding from [employer]. Patient orientated x 3. Patient meets criteria for Alcohol Use Disorder - Severe, Cocaine Use Disorder - Moderate. Patient appears appropriate for OP CD treatment services.   A) Completed intake assessment and preliminary paperwork. Patient was given and explained counselor & supervisor license number and contact info, Patient Bill of Rights, program rules/regulations, Program Abuse Prevention Plan, confidentiality & HIPPA policies, grievance procedure, presented ROIs, TB & HIV/AIDS policies & resources, and Vulnerable Adult policy.   Conducted Vulnerable Adult Assessment.   R)No special Vulnerable Adult needed at this time.  Patient signed and agreed to counselor & supervisor license number and contact info., Patient Bill of Rights, group rules/regulations, Program Abuse Prevention Plan, confidentiality & HIPPA policies, grievance procedure,  ROIs, TB & HIV/AIDS policies & resources, and Vulnerable Adult policy. Patient scored high risk on PANSI screen. Patient denied suicidal ideation/intent/plan/means at this time.     Opioid Use Disorder: No   Provided \"Options for Opioid Treatment in [place] and Overdose Prevention\" No     Dimension #1 - Withdrawal Potential - Risk 0.   Recent use, last reported use, cocaine 11/24/17, alcohol 12/26/17. Mild withdrawal issues reported with reported ability to cope, none observed. Seems able to participate in OP CD services.   Dimension #2 - Biomedical - Risk 0.   No reported medical or health issues/concerns. Pt. Reports ability to obtain care is needed.  Dimension #3 - Emotional , Behavioral and Cognitive - Risk 2.    Pt. Reports a MH diagnosis of situational anxiety and depression. Pt. Reports no current MH services, but medication compliance. Seems to lack insight into the interactions between his use and " his MH.   Dimension #4 - Treatment Resistance - Risk 0.   Pt. self-referred to treatment. Pt. Verbalizes awareness of use as an issue and reports a personal desire for sobriety.  Dimension #5 - Relapse Potential - Risk 3.   Pt. has minimal prior experience with treatment and recovery. He reports continued use without ability to arrest use on his own. Pt. may need to build an understanding of the relapse process and strengthen his personal awareness.   Dimension #6 - Recovery Environment - Risk 2.   Self-employed, but lacks other meaningful activities and healthy socialization. Identifies boredom and isolation as sobriety risks. Not connected to any support system.   T) Explained counselor & supervisor license number and contact info, Patient Bill of Rights, program rules/regulations, Probation Abuse Prevention Plan, confidentiality & HIPPA policies, grievance procedure, presented ROIs, TB & HIV/AIDS policies & resources, and Vulnerable Adult policy. Patient expected to start group on 12/28/17.      Eleanor Abraham  12/27/2017, 3:45 PM

## 2021-06-15 NOTE — PROGRESS NOTES
Daily Group Note:    Glenn Rene attended 3 hours of group therapy today. T) Communication.      Staff Name and Title: RANDOLPH Jaime, [facility]    Date:  2/1/2018  Time:  8:46 PM

## 2021-06-15 NOTE — PROGRESS NOTES
Weekly Progress Note  Glenn Rene  1985  [id number]      D) Pt. attended 1 groups this week with 0 absences. Pt. attended 0 individual sessions this week. A) Staff facilitated groups and reviewed tx progress. [facility] for [facility]. R) No VAP needed at this time.    Any significant events, defines as events that impact patients relationship with others inside and outside of treatment: No.  Indicate any changes or monitoring of physical or mental health problems: No.    Indicate involvement by any outside supports: No  IAPP reviewed and modified as needed: N/A       Pt. working on the following dimensions:    Dimension #1 - Withdrawal Potential - Risk 0.     Specific goals from treatment plan addressed this week:  Address withdrawal issues appropriately.   Effectiveness of strategies:  No reported use. Last reported use, cocaine 11/24/17, alcohol 12/26/17. Pt. Reports some difficult coping with withdrawal symptoms earlier in the week, but after taking some time to rest at home feeling better. He reports no current concern.    Dimension #2 - Biomedical - Risk 0.     Specific goals from treatment plan addressed this week:  Maintain regular exercise.   Effectiveness of strategies:  Pt. Reports taking a walk this week, but due to withdrawal symptoms not being able to complete his usual workout.    Dimension #3 - Emotional/Behavioral/Cognitive - Risk 2.     Specific goals from treatment plan addressed this week:  Strengthen health coping skills of MH issues.  Effectiveness of strategies:  Pt. Reports working on improving his outlook on stressful and frustrating situations in his life. He reports no mood issues this week. Mood appears healthy and positive.     Dimension #4 - Treatment Acceptance/Resistance - Risk 0.    Specific goals from treatment plan addressed this week:  Follow through on personal commitment to complete treatment.  Effectiveness of strategies:  Pt. Attends group as scheduled and participates well in  group session.    Dimension #5 - Relapse Potential - Risk 3.     Specific goals from treatment plan addressed this week:  Build awareness and insight into use patterns, relapse risk factors, and current recovery support needs.  Effectiveness of strategies:  Pt. Reports maintaining sobriety this week and no urges to use.    Dimension #6 - Recovery Environment - Risk 2.     Specific goals from treatment plan addressed this week:  Establish a connection with spiritual supports.  Effectiveness of strategies:  Pt. Reports he has asked his sister to go with him to a new Faith this weekend, they are planning to go.    T) Treatment plan updated: No.  Patient notified and in agreement: N/A.  Patient educated on: Nature of Addiction. Patient has completed 10 of 84 program hours at this time. Projected discharge date is: 3/22/18. Current discharge plan is: PENDING.     Staff Name and Title:   RANDOLPH Jaime, [facility]  Date: 1/5/2018   Time: 11:11 AM      Psycho-Educational Curriculum  Educational Videos  Date (s) Attended    Nature of Addiction       Cross Addiction  1/4/18   Co-Occurring Disorders             Distorted Thinking            12 Steps             Communication Skills             Unmanageable Feelings             Relapse Prevention             Relationships (Week 1)            Relationships (Week 2)            Distorted Self Image            Recovery Maintenance             Holiday Planning         12/27/17,  12/28/17,                                       Mandatory Education:       HIV/AIDS

## 2021-06-15 NOTE — PROGRESS NOTES
HPI: Pt is here for follow up of a right thigh incision and drainage on 1/21/2021 and 1/24 for necrotizing infection.  He states that he is even getting better.  Pain is lessening.  Has noticed a little bit of drainage coming out of the inner most wound.  No fevers.  He continues to pack on himself.  He does complain about some redness of his lower thigh which has been present for a few weeks now and sometimes it burns and itches.      There were no vitals taken for this visit.    EXAM:  GENERAL:Appears well  Wounds are healing quite well.  His most medial small incision measures 1 cm in depth and then 1 cm with length and width good granulation tissue.  No fibrinous debris.  His most superior linear wound is getting significantly smaller.  The half a centimeter width and length is 5 cm and good granulation tissue and the depth is less than a half a centimeter.  He has very minimal firmness just inner thigh and some mild edema.  He has a very fine maculopapular rash on his mid thigh which I have seen but has not changed.      Assessment/Plan: .  Patient is doing very well.  Continue packing and return in 2 weeks for recheck.  He will be returning right before going back to work.  I think that he has left over rash from edema and pressure I do not think this is cellulitis or allergic reaction.  However he may have some success with Zyrtec which she will take for the next 3 days.  Okay refill of Percocet.  Doing well after surgery and should follow up as needed.  Kristyn Tolbert PA-C  [facility] Department of Surgery

## 2021-06-15 NOTE — PROGRESS NOTES
1st Group Session:    D) Pt. Attended 3 hours of group this date. A) Staff facilitated group and reviewed goals. Pt. introduced self  and reported on CD history. Pt. Participated appropriately and appeared open and honest.   T) Holiday Processing and Planning.    Staff Name and Title: RANDOLPH Jaime, [facility]    Date:  12/28/2017  Time:  8:30 PM

## 2021-06-15 NOTE — PROGRESS NOTES
Addiction Services - Initial Services Plan   Name:Glenn Rene   : 1985   MRN: [id number]       Patient describes their immediate need:   To learn sober living skills to prevent relapse.     Are there any immediate Safety Needs such as (physical, stability, mobility):   None, Pt denies immediate concerns.     Immediate Health Needs and Plan:   Remain clean and sober and attend first group therapy session on: 17.    Vulnerable Adult: No     [ ] Continue Current Medications for:    [ ] Request Consult for:   [ ] Notify Attending Physician about:   [ ] Other:     Issues to be addressed in the first sessions:   Treatment planning, orientation to group norms and rules, and welcomed by peers.     Patient strengths and needs:   Strengths identified as: good father, self motivated.   Needs identified as: less isolation and boredom.     Plan for patient for time between intake and completion of the treatment plan:   Attend all group therapy sessions as directed, complete all written and oral assignments as directed, and remain clean and sober. A relapse, if any, must be reported to staff immediately in order to ensure you are receiving the proper level of care. If you cannot attend a group therapy session you must call contact information provided in intake folder and leave a message before or during group hours.     Staff Members' Titles Authorized to Initiate Services are:   Director of Behavioral Service   Clinical Director of Chemical Dependency   [facility] Counselor   MI/CD Counselor        Vulnerable Adult Review:  [X] Review of the facility Abuse Prevention plan was reviewed with the patient   [X] No individual abuse plan is necessary   [ ] In addition to the facility Abuse Prevention plan, an Individual Abuse Plan will be put in place       Staff Name/Title: RANDOLPH Tijerina, [facility]  Date: 2017 Time: 3:37 PM

## 2021-06-15 NOTE — PROGRESS NOTES
Daily Group Note:    Glenn Rene attended 3 hours of group therapy today. T) Communication.      Staff Name and Title: RANDOLPH Jaime, [facility]    Date:  1/31/2018  Time:  9:01 PM

## 2021-06-15 NOTE — PROGRESS NOTES
Daily Group Note:    Glenn Rene attended 3 hours of group therapy today. T) Co-Occurring Disorders.      Staff Name and Title: RANDOLPH Jaime, [facility]    Date:  1/11/2018  Time:  8:11 PM

## 2021-06-15 NOTE — PROGRESS NOTES
Daily Group Note:    Glenn Rene attended 3 hours of group therapy today. T) Communication.      Staff Name and Title: RANDOLPH Jaime, [facility]    Date:  1/29/2018  Time:  9:18 PM

## 2021-06-15 NOTE — PROGRESS NOTES
Individual Treatment Plan    Patient  Name: Glenn Rene  MRN: [id number]   : 1985  Admit Date: 17  Date of Initial Service Plan: 17  Tentative Discharge Date: 3/22/18    Counselor: RANDOLPH Jaime, [facility]      Dimension 1: Acute Intoxication/Withdrawal Potential, Risk level: 0    Problem: Recent use from time of intake, mild concern for withdrawal issues.   Goal: Address any withdrawal issues appropriately.  Must be reached to complete treatment? Yes  Methods/Strategies (must include amount and frequency): Report weekly any use. Follow all recommendations and seek medical attention immediately if needed.  Target Date: 3/22/18  Completion Date:         Dimension 2: Biomedical Conditions/Complications, Risk level: 0    Problem: Reports beginning the incorporation of regular exercise into life.   Goal: Maintain a regular exercise.  Must be reached to complete treatment? No  Methods/Strategies (must include amount and frequency): Workout/exercise 3x weekly for 1 hour.  Target Date: 18  Completion Date:      Dimension 3: Emotional/Behavioral/Cognitive, Risk level: 2    Problem: Reported situational anxiety and depression issues.  Goal: Strengthen healthy coping skills of MH issues.  Must be reached to complete treatment? No  Methods/Strategies (must include amount and frequency): Report and address in group at minimal 1x weekly any mood/emotional issues, process openly being open to feedback.  Target Date: 3/22/18  Completion Date:       Dimension 4: Readiness to Change, Risk level 0    Problem: Sought out treatment for self.  Goal: Follow through on personal commitment to complete.  Must be reached to complete treatment? No  Methods/Strategies (must include amount and frequency): Attend group sessions as scheduled current of the program, phase one 3 hours 4x a week; then follow the schedule of each phase of the program until complete.   Target Date: 3/22/18  Completion Date:        Dimension 5: Relapse/Continued Use/Continued Problem Potential, Risk level: 3    Problem: Limited prior experience with treatment/recovery.  Goal: Build awareness and insight into use patterns, relapse risk factors, and current recovery needs.   Must be reached to complete treatment? Yes  Methods/Strategies (must include amount and frequency): Review the  1st Step/Inventory  assignment and begin preparing your presentation. Once complete set a group presentation date with counselor by the end of phase one of the program.   Target Date: 2/1/18  Completion Date:       Dimension 6: Recovery Environment, Risk level: 2    Problem: Disconnected from spiritual support.  Goal: Establish a connection with spiritual support.  Must be reached to complete treatment? No  Methods/Strategies (must include amount and frequency): Incorporate regular involvement, 2x weekly, with Taoist and other forms of spiritual wellness support.  Target Date: 2/28/18  Completion Date:       Resources  Resources to which the patient is being referred for problems when problems are to be addressed concurrently by another provider: None at this time.      By signing this document, I am acknowledging that I was actively and directly involved in the development of my treatment plan.       Patient  Signature:_________________________________________         Date:__________________       Staff Signature: RANDOLPH Jaime, [facility]    Date: 1/3/18

## 2021-06-15 NOTE — PROGRESS NOTES
HPI: Pt is here for follow up of a neck fasciitis infection with right thigh incision and drainage on 1/21/2021 and repeat I&D on 1/24/2021.  He states he is doing very well.  No question concerns.      There were no vitals taken for this visit.    EXAM:  GENERAL:Appears well  ABDOMEN:  Soft, +BS  SURGICAL WOUNDS:  Incisions healing well, no enduration or drainage.  His lower inner thigh there is a little bit of opening but no purulence or drainage.  Very superficial.      Assessment/Plan: . Doing well after surgery and should follow up as needed.  Kristyn Tolbert PA-C  [facility] Department of Surgery

## 2021-06-15 NOTE — PROGRESS NOTES
Daily Group Note:    Glenn Rene attended 3 hours of group therapy today. T) Nature of Addiction.      Staff Name and Title: RANDOLPH Jaime, [facility]    Date:  1/4/2018  Time:  9:02 PM

## 2021-06-15 NOTE — PROGRESS NOTES
Daily Group Note:    Glenn Rene attended 3 hours of group therapy today. T) Distorted Thinking.      Staff Name and Title: RANDOLPH Jaime, [facility]    Date:  1/18/2018  Time:  8:32 PM

## 2021-06-15 NOTE — PROGRESS NOTES
Weekly Progress Note  Glenn Rene  1985  [id number]      D) Pt. attended 2 groups this week with 0 absences. Pt. attended 0 individual sessions this week. A) Staff facilitated groups and reviewed tx progress. [facility] for [facility]. R) No VAP needed at this time.    Any significant events, defines as events that impact patients relationship with others inside and outside of treatment: No.  Indicate any changes or monitoring of physical or mental health problems: No.    Indicate involvement by any outside supports: No  IAPP reviewed and modified as needed: N/A       Pt. working on the following dimensions:    Dimension #1 - Withdrawal Potential - Risk 0.     Specific goals from treatment plan addressed this week:  Address withdrawal issues appropriately.   Effectiveness of strategies:  No reported use this week or withdrawal issues. Last reported use, cocaine 1/26/18, alcohol 1/26/18.     Dimension #2 - Biomedical - Risk 0.     Specific goals from treatment plan addressed this week:  Maintain regular exercise.   Effectiveness of strategies:  Pt. Reports he has not been going to the gym, but plans to get back into his routine. He does report plans to return to playing hockey this week with his community team.    Dimension #3 - Emotional/Behavioral/Cognitive - Risk 2.     Specific goals from treatment plan addressed this week:  Strengthen health coping skills of MH issues.  Effectiveness of strategies:  Pt. Reports maintaining a healthy focus on himself and his own needs/goals, not getting caughtup in the actions or attitudes of those who are an unhealthy influence. He reports no mood issues this week . Mood appears healthy and positive.      Dimension #4 - Treatment Acceptance/Resistance - Risk 0.    Specific goals from treatment plan addressed this week:  Follow through on personal commitment to complete treatment.  Effectiveness of strategies:  Pt. Is scheduled tocontinue attending group 4x weekly, 3 hours each  "session. This week he was excused from 2 group sessions due to reported illness.    Dimension #5 - Relapse Potential - Risk 3.     Specific goals from treatment plan addressed this week:  Build awareness and insight into use patterns, relapse risk factors, and current recovery support needs.  Effectiveness of strategies:  Pt. Reports no use since his relapse with alcohol and cocaine on 1/26/18. Pt. Reports this week giving much thought and personal reflection to what his sobriety support needs are, identifying being active and involved with others. Counselor encouraged Pt. To work on his \"1st Step\" assignment to aid in the self reflection process.    Dimension #6 - Recovery Environment - Risk 2.     Specific goals from treatment plan addressed this week:  Establish a connection with spiritual supports.  Effectiveness of strategies:  Pt. Reports a part of his self reflections this week included recognizing a desire to improve his relationship with his higher power. Pt. Reports he has plans to watch a live stream of Anabaptism this weekend. He reports that he has taken his Bible out and is reading from it daily. Pt. reports while working with his  on child support and visitation judgement orders continuing to have 2x weekly supervised visits with his son, no issues.    T) Treatment plan updated: No.  Patient notified and in agreement: N/A.  Patient educated on: Unmanageable Feelings. Patient has completed 38 of 84 program hours at this time. Projected discharge date is: 3/22/18. Current discharge plan is: PENDING.     Staff Name and Title:   RANDOLPH Jaime, [facility]  Date: 2/9/2018  Time: 7:52 AM      Psycho-Educational Curriculum  Educational Videos  Date (s) Attended    Nature of Addiction       Cross Addiction  1/4/18   Co-Occurring Disorders          1/8/18,  1/9/18,  1/10/18,  1/11/18,   Distorted Thinking         1/18/18,   12 Steps             Communication Skills          1/29/18,  1/30/18,  1/31/18,  2/1/18, "   Unmanageable Feelings       Happy  2/7/18,  2/8/18,   Relapse Prevention             Relationships (Week 1)            Relationships (Week 2)            Distorted Self Image            Recovery Maintenance             Holiday Planning         12/27/17,  12/28/17,                                       Mandatory Education:       HIV/AIDS

## 2021-06-15 NOTE — PROGRESS NOTES
Weekly Progress Note  Glenn Rene  1985  [id number]      D) Pt. attended 1 groups this week with 3 absences. Pt. attended 0 individual sessions this week. A) Staff facilitated groups and reviewed tx progress. [facility]. R) No VAP needed at this time.    Any significant events, defines as events that impact patients relationship with others inside and outside of treatment: No.  Indicate any changes or monitoring of physical or mental health problems: No.    Indicate involvement by any outside supports: No  IAPP reviewed and modified as needed: N/A       Pt. working on the following dimensions:    Dimension #1 - Withdrawal Potential - Risk 0.     Specific goals from treatment plan addressed this week:  Address withdrawal issues appropriately.   Effectiveness of strategies:  No reported use. Last reported use, cocaine 11/24/17, alcohol 12/26/17. Pt. Reports no withdrawal symptoms.    Dimension #2 - Biomedical - Risk 0.     Specific goals from treatment plan addressed this week:  Maintain regular exercise.   Effectiveness of strategies:  Pt. Reports going to the gym 1x this week. He reports his new work schedule has interrupted his routine.    Dimension #3 - Emotional/Behavioral/Cognitive - Risk 2.     Specific goals from treatment plan addressed this week:  Strengthen health coping skills of MH issues.  Effectiveness of strategies:  Pt. Reports continued improvement with his outlook on stressful and frustrating situations in his life. He reports he is now placing a greater focus on himself and not the actions and attitudes of others. He reports no mood issues this week. Mood appears healthy and positive. Pt. Seems to be managing his anger and stress in healthy ways. He also reports continuing to be mindful of his issues with impulsivity, making sure not to rush into a unhealthy initial reaction and taking time to think it through first.    Dimension #4 - Treatment Acceptance/Resistance - Risk  0.    Specific goals from treatment plan addressed this week:  Follow through on personal commitment to complete treatment.  Effectiveness of strategies:  Pt. Attends group 1x weekly due to employment schedule needs, he and counselor have agreed to add in 1x1 sessions as needed.    Dimension #5 - Relapse Potential - Risk 3.     Specific goals from treatment plan addressed this week:  Build awareness and insight into use patterns, relapse risk factors, and current recovery support needs.  Effectiveness of strategies:  Pt. Reports maintaining sobriety this week. Pt. Had Identified after work as a time when a lot of his use occurred and now when he experiences urges. He reports no experience of urges to use this week. He attributes this to a busy work schedule now having 2 jobs not allowing him to give using a thought.     Dimension #6 - Recovery Environment - Risk 2.     Specific goals from treatment plan addressed this week:  Establish a connection with spiritual supports.  Effectiveness of strategies:  Pt. Reports he had not gotten to [employer] yet. Pt. Reports he has taken an evening job. He reports hopes to not maintain 2 jobs for the long term, but rather just enough to be able to support his supervision fees for visitation sessions with his son. He reports having a visitation session with his son this week.     T) Treatment plan updated: No.  Patient notified and in agreement: N/A.  Patient educated on: Distorted Thinking. Patient has completed 20 of 84 program hours at this time. Projected discharge date is: 3/22/18. Current discharge plan is: PENDING.     Staff Name and Title:   RANDOLPH Jaime, [facility]  Date: 1/19/2018  Time: 8:43 AM      Psycho-Educational Curriculum  Educational Videos  Date (s) Attended    Nature of Addiction       Cross Addiction  1/4/18   Co-Occurring Disorders          1/8/18,  1/9/18,  1/10/18,  1/11/18,   Distorted Thinking         1/18/18,   12 Steps             Communication  Skills             Unmanageable Feelings             Relapse Prevention             Relationships (Week 1)            Relationships (Week 2)            Distorted Self Image            Recovery Maintenance             Holiday Planning         12/27/17,  12/28/17,                                       Mandatory Education:       HIV/AIDS

## 2021-06-15 NOTE — PROGRESS NOTES
[facility] Assessment Summary  Date: 2017        : RANDOLPH Tijerina, [facility]    Name: Glenn Rene  Address: [address] [place] [address]    Phone: [phone] (home)   Referral source: Self  : 1985  Age: 32 y.o.  Race/Ethnicity: White or   Marital status: single  Employment: No Data Recorded  Level of education: 14  Socio-economic (yearly income) status: 4,000 monthly.  Sexual orientation: male  Last 4 digits of social security: [phone]    Is assistance required in the ability to read and understand written material?   No     Reason for seeking services    Pt. reports continued use issues with alcohol, recently gained 30 days of sobriety from cocaine. He reports a need for assistance in maintaining sobriety for the long term.    Dimension I Acute Intoxication/Withdrawal Potential:    Symptomology (past 12 months, check all that apply)  weekly intoxication, secretive use, using alone, repeated family or social problems, mood swings, loss of control and family history of addiction   Observed or reported (withdrawal symptoms, check all that apply)  unable to sleep, agitation, sad/depressed feeling, irritability and anxiety/worried     Chemical use most recent 12 months outside a facility and other significant use history (client self-report)  Primary Drug Used  Age of First Use  Most Recent Pattern of Use and Duration    Date of last use  Time if substance use in the last 30 days Withdrawal Potential? Requiring special care  Method of use   (oral, smoked, snort, IV, etc)    Alcohol  12 4-5x weekly, one pint. 16 5pm  Oral   Marijuana/Hashish          Cocaine/Crack  16 1x weekly, one gram. 16   Snort   Meth/Amphetamines          Heroin          Other Opiates/Synthetics          Inhalants          Benzodiazepines          Hallucinogens          Barbiturates/Sedatives/Hypnotics          Over-the-Counter Drugs          Other          Nicotine             Dimension I Risk Ratin  Reason Risk Rating Assigned: Recent use, last reported use, cocaine 16, alcohol 16. Mild withdrawal issues reported with reported ability to cope, none observed. Seems able to participate in OP CD services.        Dimension II Biomedical Conditions:    Any known health conditions: No  Ever previously treated/diagnosed with any eating disorder?  No  List health concerns/conditions reported: None reported.  Are health concerns/conditions being treated? No  By whom? N/A  Are you pregnant: N/A OB care received: N/A CPS call needed: N/A    Dimension II Risk Ratin  Reason Risk Rating Assigned: No reported medical or health issues/concerns. Pt. Reports ability to obtain care is needed.    Dimension III Emotional/Behavioral/Cognitive:    Oriented to person, place, time, situation? Yes  Currently attending mental health services: No  Past hospitalization for MH or psychiatric problems: No  How many hospitalizations: N/A   Last hospitalization; date and location: N/A    Past or current issues with gambling: No  Prior treatment for gambling: No  MH diagnoses:  Situational anxiety and depression. Pt. reports this diagnosis was given in 2017.  Psychiatrist: N/A     Clinic: N/A   Current psychotropic medications:  Satapram, diagnosis and medication prescribed by a PCP, regular care not established.  Taking medications as prescribed Yes Are medications helpful: No  Current suicidal ideation:  No  Previous suicide attempts? No  Current homicidal ideation: No  Previous homicide attempts? No  Suicidal/Homicidal ideation in last 30 days? No  Hazardous behavior engaged in which placed self or others in danger (i.e., operating a motor vehicle, unsafe sex, sharing needles, etc.)?   None reported.  Family history of substance and/or mental health diagnosis/issues?  Yes Explain: Father - alcohol.  History of abuse (Physical, Emotional, Sexual)? No  Explain: N/A       Dimension III Risk  Ratin  Reason Risk Rating Assigned: Pt. Reports a MH diagnosis of situational anxiety and depression. Pt. Reports no current MH services, but medication compliance. Seems to lack insight into the interactions between his use and his MH.     Dimension IV Readiness to Change:    Mandated, or coerced into assessment or treatment: No  Does client feel there is a problem: Yes  Verbalization of need/desire to change: Yes  Impression of : {Blank multiple:17587:: cooperative, genuinely motivated.    Are there any spiritual, cultural, or other special needs to be addressed for client to be successful in treatment? No      Dimension IV Risk Ratin  Reason Risk Rating Assigned: Pt. self-referred to treatment. Pt. Verbalizes awareness of use as an issue and reports a personal desire for sobriety.         Dimension V Relapse/Continued Use/Continued Problem Potential     Attended previous treatments: Yes  Client age at First Treatment: 27  Longest Period of Abstinence: 4 months How did you accomplish this? Disconnection from unhealthy peers and getting connected with Muslim.  Circumstances which led to Relapse: boredom and return of unhealthy peer influence.  Risk Taking/Problem Behaviors Related to Use and/or Under the Influence: isolation.      Dimension V Risk Rating: 3  Reason Risk Rating Assigned: Pt. has minimal prior experience with treatment and recovery. He reports continued use without ability to arrest use on his own. Pt. may need to build an understanding of the relapse process and strengthen his personal awareness.       Dimension VI Recovery Environment   Family support:  Yes   Peer Sober Support:  No  Current living circumstances:  Lives with grandfather.  Specific activities participating in which do not involve substance use: gym/working out, jogging, hockey, work, and time with child.  Specific activities participating in which do involve substance use:  use at home alone or at a bar environment  with using peers.   Environment supportive of recovery: Yes.  People, things that threaten recovery:  Yes, connecting to using peers or not involved in meaningful activities.   Expected family involvement during treatment services: Pt. reports mother of his young son may want progress updates and he has signed a JAIMIE for this reason.  Current Legal Involvement: No  Legal Consequences related to use:  No  Occupational/Academic consequences related to use:  No.  Current ability to function in a work and/or education setting: Pt. reports his productivity suffers when using.  Current support network for recovery (including community-based recovery support): No  Do you belong to a [place]: No  Reside on reservation: No    Dimension VI Risk Ratin  Reason Risk Rating Assigned: Self-employed, but lacks other meaningful activities and healthy socialization. Identifies boredom and isolation as sobriety risks. Not connected to any support system.       Alcohol Use Disorder - Severe, Cocaine Use Disorder - Moderate       Assessment Completed Within 3 Sessions of Admission: Yes  If NO, date assessment to be completed noted in Treatment Plan: Yes    Signature of Counselor:__RANDOLPH Tijerina, [facility]________________________   Date and Time of Signature: ___2017, 3:39 PM________________

## 2021-06-16 PROBLEM — E66.01 MORBID OBESITY (H): Status: ACTIVE | Noted: 2021-02-02

## 2021-06-16 PROBLEM — M79.89 NECROTIZING SOFT TISSUE INFECTION: Status: ACTIVE | Noted: 2021-01-21

## 2021-06-16 PROBLEM — M72.6 NECROTIZING FASCIITIS (H): Status: ACTIVE | Noted: 2021-01-21

## 2021-06-16 PROBLEM — T14.8XXA WOUND INFECTION: Status: ACTIVE | Noted: 2021-01-21

## 2021-06-16 PROBLEM — L08.9 WOUND INFECTION: Status: ACTIVE | Noted: 2021-01-21

## 2021-06-16 NOTE — PROGRESS NOTES
Daily Group Note:    Glenn Rene attended 3 hours of group therapy today. T) HIV/AIDS Education and Relapse Prevention.      Staff Name and Title: RANDOLPH Jaime, [facility]    Date:  2/12/2018  Time:  8:54 PM

## 2021-06-16 NOTE — PROGRESS NOTES
Daily Group Note:    Glenn Rene attended 3 hours of group therapy today. T) Relationships.      Staff Name and Title: RANDOLPH Jaime, [facility]    Date:  2/27/2018  Time:  8:36 PM

## 2021-06-16 NOTE — PROGRESS NOTES
Daily Group Note:    Glenn Rene attended 3 hours of group therapy today. T) Nature of Addiction.    Pt. Reports maintaining sobriety.  No report of immediate concern/risk.      Staff Name and Title: RANDOLPH Jaime, [facility]    Date:  3/22/2018  Time:  8:46 PM

## 2021-06-16 NOTE — PROGRESS NOTES
Daily Group Note:    Glenn Rene attended 3 hours of group therapy today. T) Recovery Maintenance.      Staff Name and Title: RANDOLPH Jaime, [facility]    Date:  3/14/2018  Time:  8:53 PM

## 2021-06-16 NOTE — PROGRESS NOTES
Daily Group Note:    Glenn Rene attended 3 hours of group therapy today. T) Distorted Self-Image.      Staff Name and Title: RANDOLPH Jaime, [facility]    Date:  3/8/2018  Time:  8:40 PM

## 2021-06-16 NOTE — PROGRESS NOTES
Daily Group Note:    Glenn Rene attended 3 hours of group therapy today. T) Recovery Maintenance.      Staff Name and Title: RANDOLPH Jaime, [facility]    Date:  3/15/2018  Time:  9:06 PM

## 2021-06-16 NOTE — PROGRESS NOTES
Daily Group Note:    Glenn Rene attended 3 hours of group therapy today. T) Relationships.      Staff Name and Title: RANDOLPH Jaime, [facility]    Date:  2/26/2018  Time:  8:49 PM

## 2021-06-16 NOTE — PROGRESS NOTES
Daily Group Note:    Glenn Rene attended 3 hours of group therapy today. T) Spiritual Care and Relationships.      Staff Name and Title:   RANDOLPH Jaime, [facility]  Date: 2/20/2018  Time: 8:51 PM

## 2021-06-16 NOTE — PROGRESS NOTES
Weekly Progress Note  Glenn Rene  1985  [id number]      D) Pt. attended 2 groups this week with 0 absences. Pt. attended 0 individual sessions this week. A) Staff facilitated groups and reviewed tx progress. [facility] for [facility]. R) No VAP needed at this time.    Any significant events, defines as events that impact patients relationship with others inside and outside of treatment: No.  Indicate any changes or monitoring of physical or mental health problems: No.    Indicate involvement by any outside supports: No  IAPP reviewed and modified as needed: N/A       Pt. working on the following dimensions:    Dimension #1 - Withdrawal Potential - Risk 0.     Specific goals from treatment plan addressed this week:  Address withdrawal issues appropriately.   Effectiveness of strategies:  No reported use. Last reported use, cocaine 1/26/18, alcohol 1/26/18.     Dimension #2 - Biomedical - Risk 0.     Specific goals from treatment plan addressed this week:  Maintain regular exercise.   Effectiveness of strategies:  Pt. Reports he went to the gym this week.    Dimension #3 - Emotional/Behavioral/Cognitive - Risk 2.     Specific goals from treatment plan addressed this week:  Strengthen healthy coping skills of MH issues.  Effectiveness of strategies:  Pt. reports mood issues this week . He reports acting impulsively in reaction to conflict with his ex/mother of child. Pt. acknowledges his role in the situation, but seems to minimize his role to justify his actions. He processed and received healthy group feedback on impulse control coping skills. He participated in group education in impulse control S.T.O.P. Skills. Mood appears healthy and positive.      Dimension #4 - Treatment Acceptance/Resistance - Risk 0.    Specific goals from treatment plan addressed this week:  Follow through on personal commitment to complete treatment.  Effectiveness of strategies:  Pt. scheduled to attending group 2x weekly, 3 hours each  "session.     Dimension #5 - Relapse Potential - Risk 3.     Specific goals from treatment plan addressed this week:  Build awareness and insight into use patterns, relapse risk factors, and current recovery support needs.  Effectiveness of strategies:  Pt. Reports maintaining sobriety. He reports no experience of urges or triggers this week. Pt. Reports keeping busy and active is what is helping his sobriety and having an awareness that he must be intentional in filling free-time. Pt. Reports intent to work on his \"1st Step\" assignment, but not yet completed to schedule presentation date.     Dimension #6 - Recovery Environment - Risk 2.     Specific goals from treatment plan addressed this week:  Establish a connection with spiritual supports.  Effectiveness of strategies:  Pt. Reports a continued personal desire to improve his relationship with his higher power. Pt. Reports he continues with daily readings from his Bible and how this practice makes a difference in his mood and attitude. Pt. reports he has suspended his supervised visits with his son due to financial issues. He reports the visitation order will be up for review in July. He reports supporting himself and recovery over the weekend by spending time with his mother who provides sober supportive conversation and activity when doing things together. He also reports a positive supportive connection with his grandparents.     T) Treatment plan updated: No.  Patient notified and in agreement: N/A.  Patient educated on: Relationships. Patient has completed 56 of 84 program hours at this time. Projected discharge date is: 3/22/18. Current discharge plan is: PENDING.       Staff Name and Title:   RANDOLPH Jaime, [facility]  Date: 2/28/2018  Time: 2:18 PM    Psycho-Educational Curriculum  Educational Videos  Date (s) Attended    Nature of Addiction       Cross Addiction  1/4/18   Co-Occurring Disorders          1/8/18,  1/9/18,  1/10/18,  1/11/18,   Distorted " Thinking         1/18/18,   12 Steps             Communication Skills          1/29/18,  1/30/18,  1/31/18,  2/1/18,   Unmanageable Feelings       Happy  2/7/18,  2/8/18,   Relapse Prevention          2/12/18,  2/13/18,   Relationships (Week 1)         2/19/18,  2/20/18,   Relationships (Week 2)         2/26/18,  2/27/18,   Distorted Self Image            Recovery Maintenance             Holiday Planning         12/27/17,  12/28/17,                                       Mandatory Education:       HIV/AIDS

## 2021-06-16 NOTE — PROGRESS NOTES
" Weekly Progress Note  Glenn Rene  1985  [id number]      D) Pt. attended 1 groups this week with 0 absences. Pt. attended 0 individual sessions this week. A) Staff facilitated groups and reviewed tx progress. [facility] for [facility]. R) No VAP needed at this time.    Any significant events, defines as events that impact patients relationship with others inside and outside of treatment: No.  Indicate any changes or monitoring of physical or mental health problems: No.    Indicate involvement by any outside supports: No  IAPP reviewed and modified as needed: N/A       Pt. working on the following dimensions:    Dimension #1 - Withdrawal Potential - Risk 0.     Specific goals from treatment plan addressed this week:  Address withdrawal issues appropriately.   Effectiveness of strategies:  No reported use. Last reported use, cocaine 1/26/18, alcohol 1/26/18.     Dimension #2 - Biomedical - Risk 0.     Specific goals from treatment plan addressed this week:  Maintain regular exercise.   Effectiveness of strategies:  Pt. Reports he went to the gym this week.    Dimension #3 - Emotional/Behavioral/Cognitive - Risk 2.     Specific goals from treatment plan addressed this week:  Strengthen healthy coping skills of MH issues.  Effectiveness of strategies:  Pt. reports mood issues this week of stress. He reports feeling like he has to be the \"glue\" for his family. Counselor assisted Pt. In processing what his responsibilities are and what he is not responsible for. Mood appears healthy and positive.      Dimension #4 - Treatment Acceptance/Resistance - Risk 0.    Specific goals from treatment plan addressed this week:  Follow through on personal commitment to complete treatment.  Effectiveness of strategies:  Pt. scheduled to attending group 2x weekly, 3 hours each session.     Dimension #5 - Relapse Potential - Risk 3.     Specific goals from treatment plan addressed this week:  Build awareness and insight into use " "patterns, relapse risk factors, and current recovery support needs.  Effectiveness of strategies:  Pt. Reports maintaining sobriety. He reports no experience of urges or triggers this week. Pt. Reports keeping busy and active is what is helping his sobriety and having an awareness that he must be intentional in filling free-time. Pt. Reports intent to work on his \"1st Step\" assignment, but not yet completed to schedule presentation date.     Dimension #6 - Recovery Environment - Risk 2.     Specific goals from treatment plan addressed this week:  Establish a connection with spiritual supports.  Effectiveness of strategies:  Pt. Reports a continued personal desire to improve his relationship with his higher power. Pt. Reports he continues with daily readings from his Bible and how this practice makes a difference in his mood and attitude. Pt. Reports plans to look into a spiritual recovery support meeting held at a [employer] next week. Pt. reports he has suspended his supervised visits with his son due to financial issues. He reports the visitation order will be up for review in July. He reports continued healthy recovery support from his mother and family.    T) Treatment plan updated: No.  Patient notified and in agreement: N/A.  Patient educated on: Distorted Self-Image. Patient has completed 59 of 84 program hours at this time. Projected discharge date is: 3/22/18. Current discharge plan is: PENDING.       Staff Name and Title:   RANDOLPH Jaime, [facility]  Date: 3/9/2018  Time: 8:07 AM    Psycho-Educational Curriculum  Educational Videos  Date (s) Attended    Nature of Addiction       Cross Addiction  1/4/18   Co-Occurring Disorders          1/8/18,  1/9/18,  1/10/18,  1/11/18,   Distorted Thinking         1/18/18,   12 Steps             Communication Skills          1/29/18,  1/30/18,  1/31/18,  2/1/18,   Unmanageable Feelings       Happy  2/7/18,  2/8/18,   Relapse Prevention          2/12/18,  2/13/18, "   Relationships (Week 1)         2/19/18,  2/20/18,   Relationships (Week 2)         2/26/18,  2/27/18,   Distorted Self Image         3/8/18,   Recovery Maintenance             Holiday Planning         12/27/17,  12/28/17,                                       Mandatory Education:       HIV/AIDS

## 2021-06-16 NOTE — PROGRESS NOTES
Weekly Progress Note  Glenn Rene  1985  [id number]      D) Pt. attended 2 groups this week with 0 absences. Pt. attended 0 individual sessions this week. A) Staff facilitated groups and reviewed tx progress. [facility] for [facility]. R) No VAP needed at this time.    Any significant events, defines as events that impact patients relationship with others inside and outside of treatment: No.  Indicate any changes or monitoring of physical or mental health problems: No.    Indicate involvement by any outside supports: No  IAPP reviewed and modified as needed: N/A       Pt. working on the following dimensions:    Dimension #1 - Withdrawal Potential - Risk 0.     Specific goals from treatment plan addressed this week:  Address withdrawal issues appropriately.   Effectiveness of strategies:  No reported use this week or withdrawal issues. Last reported use, cocaine 1/26/18, alcohol 1/26/18.     Dimension #2 - Biomedical - Risk 0.     Specific goals from treatment plan addressed this week:  Maintain regular exercise.   Effectiveness of strategies:  Pt. Reports he plans to play hockey this week with his community team and is interested in joining another hockey team in addition.    Dimension #3 - Emotional/Behavioral/Cognitive - Risk 2.     Specific goals from treatment plan addressed this week:  Strengthen healthy coping skills of MH issues.  Effectiveness of strategies:  Pt. reports no mood issues this week . Mood appears healthy and positive.  He reports he reminds himself to put things in a healthy perspective, to not think the worst.     Dimension #4 - Treatment Acceptance/Resistance - Risk 0.    Specific goals from treatment plan addressed this week:  Follow through on personal commitment to complete treatment.  Effectiveness of strategies:  Pt. scheduled to attending group 2x weekly, 3 hours each session.     Dimension #5 - Relapse Potential - Risk 3.     Specific goals from treatment plan addressed this  "week:  Build awareness and insight into use patterns, relapse risk factors, and current recovery support needs.  Effectiveness of strategies:  Pt. Reports maintaining sobriety. He reports having urges that he was able to cope with by attending group and participating to take his mind off using. Pt. Reports intent to work on his \"1st Step\" assignment, but not yet completed to schedule presentation date.     Dimension #6 - Recovery Environment - Risk 2.     Specific goals from treatment plan addressed this week:  Establish a connection with spiritual supports.  Effectiveness of strategies:  Pt. Reports a continued personal desire to improve his relationship with his higher power. Pt. Reports he continues with daily readings from his Bible and how this practice makes a difference in his mood and attitude. Pt. reports he is suspending his supervised visits with his son due to financial issues. He reports the visitation order will be up for review in July. He reports supporting himself and recovery over the weekend by going to healthy activities. He reports co-workers who know about his recovery were present and that helped with accountability. Pt. Participated in group discussions and education on relationships this week, identifying healthy vs. unhealthy relationship patterns, and effective communication skills for conflict resolution.    T) Treatment plan updated: No.  Patient notified and in agreement: N/A.  Patient educated on: Relationships. Patient has completed 50 of 84 program hours at this time. Projected discharge date is: 3/22/18. Current discharge plan is: PENDING.     Staff Name and Title:   RANDOLPH Jaime, [facility]  Date: 2/23/2018  Time: 8:04 AM    Psycho-Educational Curriculum  Educational Videos  Date (s) Attended    Nature of Addiction       Cross Addiction  1/4/18   Co-Occurring Disorders          1/8/18,  1/9/18,  1/10/18,  1/11/18,   Distorted Thinking         1/18/18,   12 Steps           "   Communication Skills          1/29/18,  1/30/18,  1/31/18,  2/1/18,   Unmanageable Feelings       Happy  2/7/18,  2/8/18,   Relapse Prevention          2/12/18,  2/13/18,   Relationships (Week 1)         2/19/18,  2/20/18,   Relationships (Week 2)            Distorted Self Image            Recovery Maintenance             Holiday Planning         12/27/17,  12/28/17,                                       Mandatory Education:       HIV/AIDS

## 2021-06-16 NOTE — PROGRESS NOTES
Weekly Progress Note  Glenn Rene  1985  [id number]      D) Pt. attended 1 groups this week with 0 absences. Pt. attended 0 individual sessions this week. A) Staff facilitated groups and reviewed tx progress. [facility] for [facility]. R) No VAP needed at this time.    Any significant events, defines as events that impact patients relationship with others inside and outside of treatment: No.   Indicate any changes or monitoring of physical or mental health problems: No.    Indicate involvement by any outside supports: No.  IAPP reviewed and modified as needed: N/A       Pt. working on the following dimensions:    Dimension #1 - Withdrawal Potential - Risk 0.     Specific goals from treatment plan addressed this week:  Address withdrawal issues appropriately.   Effectiveness of strategies:  No reported use. Last reported use, cocaine 1/26/18, alcohol 1/26/18.     Dimension #2 - Biomedical - Risk 0.     Specific goals from treatment plan addressed this week:  Maintain regular exercise.   Effectiveness of strategies:  Pt. Reports he went jogging and to the batting cage this week.    Dimension #3 - Emotional/Behavioral/Cognitive - Risk 2.     Specific goals from treatment plan addressed this week:  Strengthen healthy coping skills of MH issues.  Effectiveness of strategies:  Pt. reports mood issues this week with anger. He reports an improved mood and improved ability to manage impulsivity when he spends time with family, can vent to his mother, and when hitting balls at the baseball batting cage. Pt. Processed in group how he tends to go to the negative instead of looking at the positive. Mood appears healthy and positive.      Dimension #4 - Treatment Acceptance/Resistance - Risk 0.    Specific goals from treatment plan addressed this week:  Follow through on personal commitment to complete treatment.  Effectiveness of strategies:  Pt. scheduled to attending group 2x weekly, 3 hours each session. This week he attended  "1 session, requesting to be excused 1 session to talk with his mother for support through mood issues.    Dimension #5 - Relapse Potential - Risk 3.     Specific goals from treatment plan addressed this week:  Build awareness and insight into use patterns, relapse risk factors, and current recovery support needs.  Effectiveness of strategies:  Pt. Reports maintaining sobriety. He reports no experience of urges or triggers this week. However Pt. Was sent to the lab on 3/22/18 and results have come back positive for cocaine. Confirmation results are not back yet, counselor will follow up with Pt. Pt. Reports intent to work on his \"1st Step\" assignment, but not yet completed to schedule presentation date.     Dimension #6 - Recovery Environment - Risk 2.     Specific goals from treatment plan addressed this week:  Establish a connection with spiritual supports.  Effectiveness of strategies:  Pt. Reports not currently feeling like working on his relationship with his higher power and no plans to attend [employer] next week. Pt. Reports no attendance to sober support meetings this week and no plans to attend next week. Pt. reports he has suspended his supervised visits with his son due to financial issues, but requested and was approved to have a visit with him this weekend on 3/25/18 for a couple of supervised hours. He reports continued healthy recovery support from his mother and family.     T) Treatment plan updated: No.  Patient notified and in agreement: N/A.  Patient educated on: Nature of Addiction. Patient has completed 68 of 84 program hours at this time. Projected discharge date is: 4/26/18. Current discharge plan is: PENDING.     Staff Name and Title:   RANDOLPH Jaime, [facility]  Date: 3/23/2018  Time: 10:18 AM    Psycho-Educational Curriculum  Educational Videos  Date (s) Attended    Nature of Addiction       Cross Addiction  1/4/18,  3/22/18,   Co-Occurring Disorders          1/8/18,  1/9/18,  1/10/18,  1/11/18, "   Distorted Thinking         1/18/18,   12 Steps             Communication Skills          1/29/18,  1/30/18,  1/31/18,  2/1/18,   Unmanageable Feelings       Happy  2/7/18,  2/8/18,   Relapse Prevention          2/12/18,  2/13/18,   Relationships (Week 1)         2/19/18,  2/20/18,   Relationships (Week 2)         2/26/18,  2/27/18,   Distorted Self Image         3/8/18,   Recovery Maintenance          3/14/18,  3/15/18,   Holiday Planning         12/27/17,  12/28/17,                                       Mandatory Education:       HIV/AIDS

## 2021-06-16 NOTE — PROGRESS NOTES
Daily Group Note:    Glenn Rene attended 3 hours of group therapy today. T) Relapse Prevention.      Staff Name and Title: RANDOLPH Jaime, [facility]    Date:  2/13/2018  Time:  9:04 PM

## 2021-06-16 NOTE — PROGRESS NOTES
Daily Group Note:    Glenn Rene attended 3 hours of group therapy today. T) Relationships.      Staff Name and Title: RANDOLPH Jaime, [facility]    Date:  2/19/2018  Time:  8:53 PM

## 2021-06-16 NOTE — PROGRESS NOTES
Weekly Progress Note  Glenn Rene  1985  [id number]      D) Pt. attended 2 groups this week with 0 absences. Pt. attended 0 individual sessions this week. A) Staff facilitated groups and reviewed tx progress. [facility] for [facility]. R) No VAP needed at this time.    Any significant events, defines as events that impact patients relationship with others inside and outside of treatment: Yes, met and attended a sober support meeting with treatment peers. Reports a positive experience.   Indicate any changes or monitoring of physical or mental health problems: No.    Indicate involvement by any outside supports: Yes, attended a sober support meeting ([employer]) 1x this week.  IAPP reviewed and modified as needed: N/A       Pt. working on the following dimensions:    Dimension #1 - Withdrawal Potential - Risk 0.     Specific goals from treatment plan addressed this week:  Address withdrawal issues appropriately.   Effectiveness of strategies:  No reported use. Last reported use, cocaine 1/26/18, alcohol 1/26/18.     Dimension #2 - Biomedical - Risk 0.     Specific goals from treatment plan addressed this week:  Maintain regular exercise.   Effectiveness of strategies:  Pt. Reports he went to the gym this week.    Dimension #3 - Emotional/Behavioral/Cognitive - Risk 2.     Specific goals from treatment plan addressed this week:  Strengthen healthy coping skills of MH issues.  Effectiveness of strategies:  Pt. reports no mood issues this week. He reports an improved mood due to being mindful of his focus, keeking a focus on the positive progres he is making in his life and looking forward to a better future. Mood appears healthy and positive.      Dimension #4 - Treatment Acceptance/Resistance - Risk 0.    Specific goals from treatment plan addressed this week:  Follow through on personal commitment to complete treatment.  Effectiveness of strategies:  Pt. scheduled to attending group 2x weekly, 3 hours each session.  "    Dimension #5 - Relapse Potential - Risk 3.     Specific goals from treatment plan addressed this week:  Build awareness and insight into use patterns, relapse risk factors, and current recovery support needs.  Effectiveness of strategies:  Pt. Reports maintaining sobriety. He reports no experience of urges or triggers this week. Pt. Reports keeping busy and active is what is helping his sobriety and having an awareness that he must be intentional in filling free-time. Pt. Reports intent to work on his \"1st Step\" assignment, but not yet completed to schedule presentation date.     Dimension #6 - Recovery Environment - Risk 2.     Specific goals from treatment plan addressed this week:  Establish a connection with spiritual supports.  Effectiveness of strategies:  Pt. Reports a continued personal desire to improve his relationship with his higher power. He reports plans to attend [employer] next week. Pt. Reports he continues with daily readings from his Bible and how this practice makes a difference in his mood and attitude. Pt. Reports he attended a sober support meeting this week and plans to return next week. Pt. reports he has suspended his supervised visits with his son due to financial issues. He reports  continued healthy recovery support from his mother and family. Pt. Reports he has plans to play hockey this week and has asked his teammate to refrain from using alcohol around him when they play together. Pt. Reports his teammates have agreed to respect his sobriety and are offering encouragement.     T) Treatment plan updated: No.  Patient notified and in agreement: N/A.  Patient educated on: Recovery Maintenance. Patient has completed 65 of 84 program hours at this time. Projected discharge date is: 3/22/18. Current discharge plan is: PENDING.     Staff Name and Title:   RANDOLPH Jaime, [facility]  Date: 3/15/2018  Time: 10:27 PM      Psycho-Educational Curriculum  Educational Videos  Date (s) Attended  "   Nature of Addiction       Cross Addiction  1/4/18   Co-Occurring Disorders          1/8/18,  1/9/18,  1/10/18,  1/11/18,   Distorted Thinking         1/18/18,   12 Steps             Communication Skills          1/29/18,  1/30/18,  1/31/18,  2/1/18,   Unmanageable Feelings       Happy  2/7/18,  2/8/18,   Relapse Prevention          2/12/18,  2/13/18,   Relationships (Week 1)         2/19/18,  2/20/18,   Relationships (Week 2)         2/26/18,  2/27/18,   Distorted Self Image         3/8/18,   Recovery Maintenance          3/14/18,  3/15/18,   Holiday Planning         12/27/17,  12/28/17,                                       Mandatory Education:       HIV/AIDS

## 2021-06-17 NOTE — PROGRESS NOTES
Weekly Progress Note  Glenn Rene  1985  [id number]      D) Pt. attended 1 groups this week with 1 absences. Pt. attended 0 individual sessions this week. A) Staff facilitated groups and reviewed tx progress. [facility] for [facility]. R) No VAP needed at this time.    Any significant events, defines as events that impact patients relationship with others inside and outside of treatment: No.   Indicate any changes or monitoring of physical or mental health problems: No.    Indicate involvement by any outside supports: No.  IAPP reviewed and modified as needed: N/A       Pt. working on the following dimensions:    Dimension #1 - Withdrawal Potential - Risk 0.     Specific goals from treatment plan addressed this week:  Address withdrawal issues appropriately.   Effectiveness of strategies:  Reported use this week. Pt. Denies withdrawal issues/concerns. Last reported use, cocaine 3/24/18, alcohol 3/24/18.     Dimension #2 - Biomedical - Risk 0.     Specific goals from treatment plan addressed this week:  Maintain regular exercise.   Effectiveness of strategies:  Pt. Reports he continues going jogging and to the batting cage on a weekly basis.    Dimension #3 - Emotional/Behavioral/Cognitive - Risk 2.     Specific goals from treatment plan addressed this week:  Strengthen healthy coping skills of MH issues.  Effectiveness of strategies:  Pt. reports mood issues this week with anger and stress. He reports poor coping with emotions related to missing his son and the legal issues involved in custody/visitation matters. Mood appears healthy, but stressed. He reports plans to attend his first therapy session on 4/4/18 at [facility]. He processed openly in group what he hopes to gain from therapy, improved ability to cope/manage emotions.    Dimension #4 - Treatment Acceptance/Resistance - Risk 0.    Specific goals from treatment plan addressed this week:  Follow through on personal commitment to complete  treatment.  Effectiveness of strategies:  Pt. scheduled to attend group 2x weekly in phase 2 of the program, Pt. Attended 1x this week.     Dimension #5 - Relapse Potential - Risk 3.     Specific goals from treatment plan addressed this week:  Build awareness and insight into use patterns, relapse risk factors, and current recovery support needs.  Effectiveness of strategies:  Pt. Reports relapses on dates 3/17/18 and 3/24/18, going to the bar and drinking 5-6 drinks followed by contacting a using peer and using cocaine with him. Pt. Reports no further use since last reported relapses. He report experiencing emotional triggers regularly.     Dimension #6 - Recovery Environment - Risk 2.     Specific goals from treatment plan addressed this week:  Establish a connection with spiritual supports.  Effectiveness of strategies:  Pt. Reports no attendance to sober support meetings this week. Pt. reports he had another approved visit with his son on 4/2/18 for a couple of supervised hours, no issues. He reports continued healthy recovery support from his mother and family.     T) Treatment plan updated: No.  Patient notified and in agreement: N/A.  Patient educated on: Distorted Thinking. Patient has completed 75 of 84 program hours at this time. Projected discharge date is: 4/26/18. Current discharge plan is: Abstain from all mood altering substances. Attend individual therapy as scheduled, weekly or bi-weekly to be determined by provider. Attend weekly a sober support meeting.     Staff Name and Title:   RANDOLPH Jaime, [facility]  Date: 4/5/2018  Time: 8:24 PM    Psycho-Educational Curriculum  Educational Videos  Date (s) Attended    Nature of Addiction       Cross Addiction  1/4/18,  3/22/18,   Co-Occurring Disorders          1/8/18,  1/9/18,  1/10/18,  1/11/18,  3/29/18,   Distorted Thinking         1/18/18,  4/2/18,     12 Steps             Communication Skills          1/29/18,  1/30/18,  1/31/18,  2/1/18,    Unmanageable Feelings       Happy  2/7/18,  2/8/18,   Relapse Prevention          2/12/18,  2/13/18,   Relationships (Week 1)         2/19/18,  2/20/18,   Relationships (Week 2)         2/26/18,  2/27/18,   Distorted Self Image         3/8/18,   Recovery Maintenance          3/14/18,  3/15/18,   Holiday Planning         12/27/17,  12/28/17,                                       Mandatory Education:       HIV/AIDS

## 2021-06-17 NOTE — PROGRESS NOTES
"Daily Group Note:    Glenn Rene attended 2 hours of group therapy today. T) Recovery Support Programs.      1st Step:    D) Pt. Presented \"1st Step\" assignment to [facility] group this date. Pt. received feedback on \"1st Step\" assignment presentation from group peers.    R) Pt. Verbalized powerless and unmanageability over DOC.    Staff Name and Title: RANDOLPH Jaime, [facility][note 3]    Date:  4/11/2018  Time:  8:10 PM          "

## 2021-06-17 NOTE — PROGRESS NOTES
Daily Group Note:    Glenn Rene attended 3 hours of group therapy today. T) Unmanageable Feelings.      Staff Name and Title: RANDOLPH Jaime, [facility]    Date:  4/25/2018  Time:  8:59 PM

## 2021-06-17 NOTE — PROGRESS NOTES
1x1:    D) Pt. And counselor met 1on1 this date for 50 minuets re: sobriety concerns from positive UA results.   Pt. Reports he has been using both alcohol and cocaine 1-2x weekly on weekends, last reported use of both substances 3/24/18.  Counselor and Pt. Discussed his dishonesty and barriers he felt he was experiencing to enable his dishonesty.   Pt. Reports fear of disappointing his fellow group peers and not wanting to hear the feedback he expected to receive.  Pt. Shared that he is not coping well emotionally and counselor discussed individual therapy options with him.  He also reports ambi valance for treatment completion, not feeling group therapy is helpful.  Counselor shared her recommendations to remain in the treatment process and to process his thoughts in group.  Pt. Reported making the decision to not attend group this date despite counselors recommendations.       Staff Name and Title: RANDOLPH Jaime, [facility]    Date:  3/28/2018  Time:  8:45 PM

## 2021-06-17 NOTE — PROGRESS NOTES
Weekly Progress Note  Glenn Rene  1985  [id number]      D) Pt. attended 1 groups this week with 0 absences. Pt. attended 0 individual sessions this week. A) Staff facilitated groups and reviewed tx progress. [facility] for [facility]. R) No VAP needed at this time.    Any significant events, defines as events that impact patients relationship with others inside and outside of treatment: Yes, supervised weekly visitation with son reinstated.   Indicate any changes or monitoring of physical or mental health problems: Yes, began individual therapy attending one session last week on 4/4/18.    Indicate involvement by any outside supports: No.  IAPP reviewed and modified as needed: N/A       Pt. working on the following dimensions:    Dimension #1 - Withdrawal Potential - Risk 0.     Specific goals from treatment plan addressed this week:  Address withdrawal issues appropriately.   Effectiveness of strategies:  No reported use this week. Pt. Denies withdrawal issues/concerns. Last reported use, cocaine 3/24/18, alcohol 3/24/18.     Dimension #2 - Biomedical - Risk 0.     Specific goals from treatment plan addressed this week:  Maintain regular exercise.   Effectiveness of strategies:  Pt. Reports he continues going jogging and to the batting cage on a weekly basis.    Dimension #3 - Emotional/Behavioral/Cognitive - Risk 2.     Specific goals from treatment plan addressed this week:  Strengthen healthy coping skills of MH issues.  Effectiveness of strategies:  Pt. reports a improved mood this week and his mood appears improved. Mood appears healthy and positive. He reports attending his first therapy session on 4/4/18 at [facility]. He processed openly in group his experience so far with therapy. He reports at the end of the session his therapist guides him through a mindfulness exercise that he finds relaxing. Pt. Reports his plan is to attend therapy initially 2x weekly.    Dimension #4 - Treatment  "Acceptance/Resistance - Risk 0.    Specific goals from treatment plan addressed this week:  Follow through on personal commitment to complete treatment.  Effectiveness of strategies:  Pt. attended 1x this week. Pt. Requests to attend group sessions 1x weekly until program completion to allow incorporation of his therapy schedule, counselor has apporved.     Dimension #5 - Relapse Potential - Risk 3.     Specific goals from treatment plan addressed this week:  Build awareness and insight into use patterns, relapse risk factors, and current recovery support needs.  Effectiveness of strategies:  Pt. Reports no further use since relapses on dates 3/17/18 and 3/24/18, going to the bar and drinking 5-6 drinks followed by contacting a using peer and using cocaine with him. He report experiencing emotional triggers, but less frequently. Pt. Presented his \"1st Step\" assignment this week in group. He was able to identify the patterns of use throughout his life and the factors that seemed to lead to his using. He also identified the factors that supported his periods of sobriety throughout his life, sports and education. He also processed with group peers how he can reincorporate those supports back into his life for ongoing sober support.    Dimension #6 - Recovery Environment - Risk 2.     Specific goals from treatment plan addressed this week:  Establish a connection with spiritual supports.  Effectiveness of strategies:  Pt. Reports no attendance to sober support meetings this week. Pt. reports he is xiao approved and having 1x weekly supervised visits with his son. He does report that his ex filed a false violation of the no contact order against him last week and having to spend one night in half-way on 4/5/17 as a result following their child custody court appearance, but since having no further issues and denying that that claim is true. He reports continued healthy recovery support from his mother and family. Pt. Reports " he maintains employment working full-time.    T) Treatment plan updated: No.  Patient notified and in agreement: N/A.  Patient educated on: Recovery Support Programs. Patient has completed 77 of 84 program hours at this time. Projected discharge date is: 4/26/18. Current discharge plan is: Abstain from all mood altering substances. Attend individual therapy as scheduled, weekly or bi-weekly to be determined by provider. Attend weekly a sober support meeting.     Staff Name and Title:   RANDOLPH Jaime, [facility]  Date: 4/13/2018  Time: 7:11 AM    Psycho-Educational Curriculum  Educational Videos  Date (s) Attended    Nature of Addiction       Cross Addiction  1/4/18,  3/22/18,   Co-Occurring Disorders          1/8/18,  1/9/18,  1/10/18,  1/11/18,  3/29/18,   Distorted Thinking         1/18/18,  4/2/18,     Recovery Support Programs           4/11/18,   Communication Skills          1/29/18,  1/30/18,  1/31/18,  2/1/18,   Unmanageable Feelings       Happy  2/7/18,  2/8/18,   Relapse Prevention          2/12/18,  2/13/18,   Relationships (Week 1)         2/19/18,  2/20/18,   Relationships (Week 2)         2/26/18,  2/27/18,   Distorted Self Image         3/8/18,   Recovery Maintenance          3/14/18,  3/15/18,   Holiday Planning         12/27/17,  12/28/17,                                       Mandatory Education:       HIV/AIDS

## 2021-06-17 NOTE — PROGRESS NOTES
Weekly Progress Note  Glenn Rene  1985  [id number]      D) Pt. attended 1 groups this week with 1 absences. Pt. attended 0 individual sessions this week. A) Staff facilitated groups and reviewed tx progress. Assessed for [facility]. R) No VAP needed at this time.    Any significant events, defines as events that impact patients relationship with others inside and outside of treatment: No.   Indicate any changes or monitoring of physical or mental health problems: No.    Indicate involvement by any outside supports: No.  IAPP reviewed and modified as needed: N/A       Pt. working on the following dimensions:    Dimension #1 - Withdrawal Potential - Risk 0.     Specific goals from treatment plan addressed this week:  Address withdrawal issues appropriately.   Effectiveness of strategies:  Reported use this week. Pt. Denies withdrawal issues/concerns. Last reported use, cocaine 3/24/18, alcohol 3/24/18.     Dimension #2 - Biomedical - Risk 0.     Specific goals from treatment plan addressed this week:  Maintain regular exercise.   Effectiveness of strategies:  Pt. Reports he continues going jogging and to the batting cage on a weekly basis.    Dimension #3 - Emotional/Behavioral/Cognitive - Risk 2.     Specific goals from treatment plan addressed this week:  Strengthen healthy coping skills of MH issues.  Effectiveness of strategies:  Pt. reports mood issues this week with anger and stress. He reports poor coping with emotions related to missing his son and the legal issues involved in custody/visitation matters. Mood appears healthy, but stressed. Pt. Reports following counselors recommendation and has obtained services for individual therapy this week. He reports he is scheduled for the first therapy session on 4/4/18 at [facility]. He processed openly in group what he hopes to gain from therapy, improved ability to cope/manage emotions.    Dimension #4 - Treatment Acceptance/Resistance - Risk  0.    Specific goals from treatment plan addressed this week:  Follow through on personal commitment to complete treatment.  Effectiveness of strategies:  Pt. scheduled to attending group 2x weekly, 3 hours each session. This week he attended 1 session and against counselors advice skipped a group session this week following a 1x1 session. Pt. Processed his initial ambivalence to continue with treatment in group and his thought process bringing him back to group sessions. Pt. Reports he recognizes that he has other issues that can only be approprietly addressed in individual therapy and group therapy serves a different need.    Dimension #5 - Relapse Potential - Risk 3.     Specific goals from treatment plan addressed this week:  Build awareness and insight into use patterns, relapse risk factors, and current recovery support needs.  Effectiveness of strategies:  Pt. Reports relapses on dates 3/17/18 and 3/24/18. He reports for the last 2 Saturdays he has been going to the bar and drinking 5-6 drinks followed by contacting a using peer and using cocaine with him. Pt. Processed openly in group both the relapses and his dishonesty about the relapses. Pt. Reports thinking a part of him want to get caught knowing he would be UA tested. He reports not wanting to disappoint his fellow group peers, so not reporting his relapses sooner. Pt. Identifies his relapses as results from poor coping of his emotions and seeking escape from his feelings. Pt. Was encouraged to complete his 1st Step assignment and schedule a date for presentation.     Dimension #6 - Recovery Environment - Risk 2.     Specific goals from treatment plan addressed this week:  Establish a connection with spiritual supports.  Effectiveness of strategies:  Pt. Reports no attendance to sober support meetings this week. Pt. reports he had his one approved visit with his son on 3/25/18 for a couple of supervised hours, no issues. He reports continued healthy  recovery support from his mother and family. Counselor helped Pt. Process the supportive comfort he receives from his family and the difference between comfort and . Counselor also encouraged Pt. To become completely honest with his family about his struggles with emotional coping and his use as a means to coping.     T) Treatment plan updated: No.  Patient notified and in agreement: N/A.  Patient educated on: Co-Occurring Disorders. Patient has completed 72 of 84 program hours at this time. Projected discharge date is: 4/26/18. Current discharge plan is: Abstain from all mood altering substances. Attend individual therapy as scheduled, weekly or bi-weekly to be determined by provider. Attend weekly a sober support meeting.     Staff Name and Title:   RANDOLPH Jaime, [facility]  Date: 3/30/2018  Time: 9:10 AM    Psycho-Educational Curriculum  Educational Videos  Date (s) Attended    Nature of Addiction       Cross Addiction  1/4/18,  3/22/18,   Co-Occurring Disorders          1/8/18,  1/9/18,  1/10/18,  1/11/18,  3/29/18,   Distorted Thinking         1/18/18,   12 Steps             Communication Skills          1/29/18,  1/30/18,  1/31/18,  2/1/18,   Unmanageable Feelings       Happy  2/7/18,  2/8/18,   Relapse Prevention          2/12/18,  2/13/18,   Relationships (Week 1)         2/19/18,  2/20/18,   Relationships (Week 2)         2/26/18,  2/27/18,   Distorted Self Image         3/8/18,   Recovery Maintenance          3/14/18,  3/15/18,   Holiday Planning         12/27/17,  12/28/17,                                       Mandatory Education:       HIV/AIDS

## 2021-06-17 NOTE — PROGRESS NOTES
Weekly Progress Note  Glenn Rene  1985  [id number]      D) Pt. attended 1 groups this week with 0 absences. Pt. attended 0 individual sessions this week. A) Staff facilitated groups and reviewed tx progress. Assessed for [facility]. R) No VAP needed at this time.    Any significant events, defines as events that impact patients relationship with others inside and outside of treatment: No.   Indicate any changes or monitoring of physical or mental health problems: Yes, continuing weekly individual therapy sessions, last reported session was this week on 4/25/18.    Indicate involvement by any outside supports: No.  IAPP reviewed and modified as needed: N/A       Pt. working on the following dimensions:    Dimension #1 - Withdrawal Potential - Risk 0.     Specific goals from treatment plan addressed this week:  Address withdrawal issues appropriately.   Effectiveness of strategies:  No reported use this week. Pt. Denies withdrawal issues/concerns. Last reported use, cocaine 3/24/18, alcohol 3/24/18.     Dimension #2 - Biomedical - Risk 0.     Specific goals from treatment plan addressed this week:  Maintain regular exercise.   Effectiveness of strategies:  Pt. Reports he continues going jogging and playing hockey on a weekly basis.    Dimension #3 - Emotional/Behavioral/Cognitive - Risk 2.     Specific goals from treatment plan addressed this week:  Strengthen healthy coping skills of MH issues.  Effectiveness of strategies:  Pt. reports a continued improved mood this week and his mood appears improved. Mood appears healthy and positive. He reports continuing to attend therapy sessions at [facility]. He reports in therapy working on strategies to slow down his thoughts.     Dimension #4 - Treatment Acceptance/Resistance - Risk 0.    Specific goals from treatment plan addressed this week:  Follow through on personal commitment to complete treatment.  Effectiveness of strategies:  Pt. attended 1x  this week. Pt. Requests to attend group sessions 1x weekly until program completion to allow incorporation of his therapy schedule, counselor has apporved.     Dimension #5 - Relapse Potential - Risk 3.     Specific goals from treatment plan addressed this week:  Build awareness and insight into use patterns, relapse risk factors, and current recovery support needs.  Effectiveness of strategies:  Pt. Reports no further use since relapses on dates 3/17/18 and 3/24/18. He reports maintaining his sobriety, but experiencing triggers when attending a family wedding last weekend. He reports ibility to cope by having his son with him and with the support of family members who were present and know of his recovery.     Dimension #6 - Recovery Environment - Risk 2.     Specific goals from treatment plan addressed this week:  Establish a connection with spiritual supports.  Effectiveness of strategies:  Pt. Reports no attendance to sober support meetings this week. Pt. reports he continues having 1x weekly supervised visits with his son. He reports no further legal/custady issues this week. He reports continued healthy recovery support from his mother and family. Pt. Reports he maintains employment working full-time. He also reports joining a new [employer] league, one he thinks will be of better support to his recovery with less or no team member alcohol use present.     T) Treatment plan updated: No.  Patient notified and in agreement: N/A.  Patient educated on: Unmanageable Feelings. Patient has completed 80 of 84 program hours at this time. Projected discharge date is: 4/26/18. Current discharge plan is: Abstain from all mood altering substances. Attend individual therapy as scheduled, weekly or bi-weekly to be determined by provider. Attend weekly a sober support meeting.     Staff Name and Title:   RANDOLPH Jaime, [facility]  Date: 4/26/2018  Time: 8:23 PM    Psycho-Educational Curriculum  Educational Videos  Date (s) Attended     Nature of Addiction       Cross Addiction  1/4/18,  3/22/18,   Co-Occurring Disorders          1/8/18,  1/9/18,  1/10/18,  1/11/18,  3/29/18,   Distorted Thinking         1/18/18,  4/2/18,     Recovery Support Programs           4/11/18,   Communication Skills          1/29/18,  1/30/18,  1/31/18,  2/1/18,   Unmanageable Feelings       Happy  2/7/18,  2/8/18,  4/25/18,   Relapse Prevention          2/12/18,  2/13/18,   Relationships (Week 1)         2/19/18,  2/20/18,   Relationships (Week 2)         2/26/18,  2/27/18,   Distorted Self Image         3/8/18,   Recovery Maintenance          3/14/18,  3/15/18,   Holiday Planning         12/27/17,  12/28/17,                                       Mandatory Education:       HIV/AIDS

## 2021-06-17 NOTE — PROGRESS NOTES
Daily Group Note:    Glenn Rene attended 3 hours of group therapy today. T) Nurse Education and Relationships.      Staff Name and Title: RANDOLPH Jaime, [facility]    Date:  5/9/2018  Time:  7:50 PM

## 2021-06-17 NOTE — PROGRESS NOTES
Daily Group Note:    Glenn Rene attended 3 hours of group therapy today. T) Distorted Thinking.      Staff Name and Title: RANDOLPH Jaime, [facility]    Date:  4/2/2018  Time:  8:42 PM

## 2021-06-17 NOTE — PROGRESS NOTES
Weekly Progress Note  Glenn Rene  1985  [id number]      D) Pt. attended 1 groups this week with 0 absences. Pt. attended 0 individual sessions this week. A) Staff facilitated groups and reviewed tx progress. Assessed for [facility]. R) No VAP needed at this time.    Any significant events, defines as events that impact patients relationship with others inside and outside of treatment: No.   Indicate any changes or monitoring of physical or mental health problems: Yes, continuing weekly individual therapy sessions.    Indicate involvement by any outside supports: No.  IAPP reviewed and modified as needed: N/A       Pt. working on the following dimensions:    Dimension #1 - Withdrawal Potential - Risk 0.     Specific goals from treatment plan addressed this week:  Address withdrawal issues appropriately.   Effectiveness of strategies:  No reported use this week. Pt. Denies withdrawal issues/concerns. Last reported use, cocaine 3/24/18, alcohol 3/24/18.     Dimension #2 - Biomedical - Risk 0.     Specific goals from treatment plan addressed this week:  Maintain regular exercise.   Effectiveness of strategies:  Pt. Reports he continues jogging on a regular basis. He also reports playing hockey and has added softball to his weekly community sports team involvement..    Dimension #3 - Emotional/Behavioral/Cognitive - Risk 1.     Specific goals from treatment plan addressed this week:  Strengthen healthy coping skills of MH issues.  Effectiveness of strategies:  Pt. reports a continued improved mood this week and his mood appears improved. Mood appears healthy and positive. He reports continuing to attend therapy sessions at [facility]. He reports in therapy working on strategies to slow down his thoughts.     Dimension #4 - Treatment Acceptance/Resistance - Risk 0.    Specific goals from treatment plan addressed this week:  Follow through on personal commitment to complete treatment.  Effectiveness  of strategies:  Pt. attended 1x this week. Pt. Requests to attend group sessions 1x weekly until program completion to allow incorporation of his therapy schedule, counselor has apporved. Pt. Is scheduled to complete the program nest week.    Dimension #5 - Relapse Potential - Risk 2.     Specific goals from treatment plan addressed this week:  Build awareness and insight into use patterns, relapse risk factors, and current recovery support needs.  Effectiveness of strategies:  Pt. Reports he is maintaining his sobriety, no experience of urges or triggers this week. Pt. Reports he has signed himself up in a random UA program to assist with accountability following treatment.    Dimension #6 - Recovery Environment - Risk 2.     Specific goals from treatment plan addressed this week:  Establish a connection with spiritual supports.  Effectiveness of strategies:  Pt. Reports no attendance to sober support meetings this week. Pt. reports he continues having 1x weekly supervised visits with his son. He reports no further legal/custady issues this week. He reports continued healthy recovery support from his mother and family. He also reports talking with other single dad peers who are supportive to his efforts of recovery. Pt. Reports he maintains employment working full-time, working 40 hours per week. He also reports in addition to his hockey league he has joined a softball team as well.    T) Treatment plan updated: No.  Patient notified and in agreement: N/A.  Patient educated on: Relationships. Patient has completed 83 of 84 program hours at this time. Projected discharge date is: 5/17/18. Current discharge plan is: Abstain from all mood altering substances. Attend individual therapy as scheduled, weekly or bi-weekly to be determined by provider. Attend weekly a sober support meeting. Participate in a random drug UA program for accountability. Remain active in sober meaningful activities that support sobriety  (sports).    Staff Name and Title:   RANDOLPH Jaime, [facility]  Date: 5/10/2018  Time: 2:36 PM    Psycho-Educational Curriculum  Educational Videos  Date (s) Attended    Nature of Addiction       Cross Addiction  1/4/18,  3/22/18,   Co-Occurring Disorders          1/8/18,  1/9/18,  1/10/18,  1/11/18,  3/29/18,   Distorted Thinking         1/18/18,  4/2/18,     Recovery Support Programs           4/11/18,   Communication Skills          1/29/18,  1/30/18,  1/31/18,  2/1/18,   Unmanageable Feelings       Happy  2/7/18,  2/8/18,  4/25/18,   Relapse Prevention          2/12/18,  2/13/18,   Relationships (Week 1)         2/19/18,  2/20/18,  5/9/18,   Relationships (Week 2)         2/26/18,  2/27/18,   Distorted Self Image         3/8/18,   Recovery Maintenance          3/14/18,  3/15/18,   Holiday Planning         12/27/17,  12/28/17,                                       Mandatory Education:       HIV/AIDS       Nurse Education    5/9/18,

## 2021-06-17 NOTE — PROGRESS NOTES
Daily Group Note:    Glenn Rene attended 3 hours of group therapy today. T) Co-Occurring Disorders.      Staff Name and Title: RANDOLPH Jaime, [facility]    Date:  3/29/2018  Time:  8:51 PM

## 2021-06-18 NOTE — PROGRESS NOTES
Discharge:    D) Pt. D/C this date, [facility]. Pt. signed D/C papers and agreed to recommendations. Pt. and staff reviewed final assessment and consequences and continued use. Pt. attended 3 hours of group this date.    Staff Name and Title: RANDOLPH Jaime, [facility]    Date:  5/17/2018  Time:  9:17 PM

## 2021-06-18 NOTE — PROGRESS NOTES
[facility] SUBSTANCE USE DISORDER  DISCHARGE SUMMARY            NAME:  Glenn Rene   Physician: Aramis Mcconnell MD    MRN:  [id number]   :  RANDOLPH Jaime, [facility]   Admit Date: 17   Discharge Date: 18   :  1985   Hours Completed: 86   Initial Diagnosis:  Alcohol-Severe, Cocaine-Moderate    Final Diagnosis:  Alcohol-Severe, Cocaine-Moderate      Discharge Address:    [address] [place] [id number]   Funding Source:    [employer]      Discharge Type:  With Staff Approval (WSA)    Client was receiving residential services at the time of discharge:   No    Reasons for and circumstances of service termination:  Pt. discharged WSA, successfully completing the [facility] treatment program.      If program discharge status was At Staff Request, the license ram must identify the following:    Other interested parties conferred with: N/A    Referrals provided: N/A    Alternatives considered and attempted before deciding to discharge: N/A      Dimension/Course of Treatment/Individualized Care:   1.  Withdrawal Potential - Intake Risk level -  0 Discharge Risk level - 0  Narrative supporting risk description:  No recent use or withdrawal issues reported.  Treatment plan goals and progress towards those goals:  Last reported use, cocaine 3/24/18, alcohol 3/24/18.     2.  Biomedical Conditions and Complications - Intake Risk level -  0 Discharge Risk level - 0  Narrative supporting risk description:  No reported medical issues or concerns. Pt. has a personal goal for himself to exercise regularly.  Treatment plan goals and progress towards those goals:  Pt. Reports he continues jogging on a regular basis. He also reports playing hockey and has added softball to his weekly community sports team involvement.     3.  Emotional/Behavioral/Cognitive Conditions and Complications - Intake Risk level -  2 Discharge Risk level - 1  Narrative supporting risk description:  Pt. reports  an improved mood and healthy ability to cope with mood difficulties. No immediate or unaddressed concerns/issues.   Treatment plan goals and progress towards those goals:  Pt. has obtained and is maintaining appropriate  services. He reports following the  care plan and attends individual therapy as scheduled. Pt. reports personal benefits and improvement from therapy.     4.  Readiness for Change - Intake Risk level -  0 Discharge Risk level - 0  Narrative supporting risk description:  Pt. is self-motivated for his recovery. He recognizes use as a problem and is open to positive change.  Treatment plan goals and progress towards those goals:  Pt. attended group sessions as scheduled and completed the treatment program, meeting expectations.      5.  Relapse/Continued Use/Continued Problem Potential - Intake Risk level -  3 Discharge Risk level - 1  Narrative supporting risk description:  Pt. has current sobriety and is able to maintain sobriety. Pt. has knowledge of his recovery needs and has skills to support his sobriety.  Treatment plan goals and progress towards those goals:  Pt. processed openly in group seesions and through self-reflection patterns of his past use and patterns of periods of sobriety to build his insights.      6.  Recovery Environment - Intake Risk level -  2 Discharge Risk level - 0  Narrative supporting risk description:  Pt. employed full-time. Pt. has a healthy family and peer support system. Pt. is involved in healthy sober activities.   Treatment plan goals and progress towards those goals:  Pt. has identified and eliminated peer and environmental factors of risk. Pt. is maintaining healthy involvement in his son s life.     Strengths: good father, self-motivated.   Needs identified as: less isolation and boredom.    Services Provided: Intake, assessment, treatment planning, education, group discussion, film, lectures, 1x1 therapy, and recommendations at discharge.      Program  Involvement: Good  Attendance: Good   Ability to relate in group/   Other program activities: Good  Assignment Completion: Good  Overall Behavior: Good  Reported Family/Significant   Other Involvement: Good     Prognosis: Good      Recommendations       Maintain sobriety. Remain involved in sports and other healthy sober activities. Maintain a healthy sober network of peers. Maintain healthy positive involvement with son and other family support.    Mental Health Referral  Maintain regular attendance to individual therapy. Follow all recommendations and MH care plan.       Physical Health Referral:  Defer to PCP.         Counselor Name and Title:  RANDOLPH Tijerina, [facility]        Date:  5/18/2018  Time:  10:15 AM

## 2021-06-21 NOTE — LETTER
Letter by Kristyn Tolbert PA-C at      Author: Kristyn Tolbert PA-C Service: -- Author Type: --    Filed:  Encounter Date: 2/15/2021 Status: (Other)         February 15, 2021     Patient: Glenn Rene   YOB: 1985   Date of Visit: 2/15/2021       To Whom It May Concern:    It is my medical opinion that Glenn Rene may return to work on 2/25/21 without restrictions.    If you have any questions or concerns, please don't hesitate to call.    Sincerely,        Electronically signed by Kristyn Tolbert PA-C